# Patient Record
Sex: MALE | Race: WHITE | Employment: FULL TIME | URBAN - METROPOLITAN AREA
[De-identification: names, ages, dates, MRNs, and addresses within clinical notes are randomized per-mention and may not be internally consistent; named-entity substitution may affect disease eponyms.]

---

## 2018-06-13 ENCOUNTER — HOSPITAL ENCOUNTER (EMERGENCY)
Age: 33
Discharge: HOME | End: 2018-06-13
Attending: EMERGENCY MEDICINE
Payer: COMMERCIAL

## 2018-06-13 VITALS
DIASTOLIC BLOOD PRESSURE: 76 MMHG | RESPIRATION RATE: 16 BRPM | HEIGHT: 71 IN | HEART RATE: 98 BPM | SYSTOLIC BLOOD PRESSURE: 137 MMHG | OXYGEN SATURATION: 99 % | TEMPERATURE: 98.9 F

## 2018-06-13 DIAGNOSIS — E87.6 HYPOKALEMIA: ICD-10-CM

## 2018-06-13 DIAGNOSIS — T43.641A: Primary | ICD-10-CM

## 2018-06-13 LAB
ABSOLUTE EOS #: 0 K/UL (ref 0–0.4)
ABSOLUTE IMMATURE GRANULOCYTE: ABNORMAL K/UL (ref 0–0.3)
ABSOLUTE LYMPH #: 2.1 K/UL (ref 1–4.8)
ABSOLUTE MONO #: 0.5 K/UL (ref 0.1–1.3)
ACETAMINOPHEN LEVEL: <5 UG/ML (ref 10–30)
ALBUMIN SERPL-MCNC: 4 G/DL (ref 3.5–5.2)
ALBUMIN/GLOBULIN RATIO: ABNORMAL (ref 1–2.5)
ALP BLD-CCNC: 79 U/L (ref 40–129)
ALT SERPL-CCNC: 13 U/L (ref 5–41)
ANION GAP SERPL CALCULATED.3IONS-SCNC: 14 MMOL/L (ref 9–17)
AST SERPL-CCNC: 13 U/L
BASOPHILS # BLD: 1 % (ref 0–2)
BASOPHILS ABSOLUTE: 0.1 K/UL (ref 0–0.2)
BILIRUB SERPL-MCNC: 0.8 MG/DL (ref 0.3–1.2)
BUN BLDV-MCNC: 9 MG/DL (ref 6–20)
BUN/CREAT BLD: ABNORMAL (ref 9–20)
CALCIUM SERPL-MCNC: 8.7 MG/DL (ref 8.6–10.4)
CHLORIDE BLD-SCNC: 102 MMOL/L (ref 98–107)
CO2: 22 MMOL/L (ref 20–31)
CREAT SERPL-MCNC: 0.89 MG/DL (ref 0.7–1.2)
DIFFERENTIAL TYPE: ABNORMAL
EKG ATRIAL RATE: 115 BPM
EKG P AXIS: 64 DEGREES
EKG P-R INTERVAL: 140 MS
EKG Q-T INTERVAL: 342 MS
EKG QRS DURATION: 94 MS
EKG QTC CALCULATION (BAZETT): 473 MS
EKG R AXIS: 60 DEGREES
EKG T AXIS: 22 DEGREES
EKG VENTRICULAR RATE: 115 BPM
EOSINOPHILS RELATIVE PERCENT: 0 % (ref 0–4)
ETHANOL PERCENT: <0.01 %
ETHANOL: <10 MG/DL
GFR AFRICAN AMERICAN: >60 ML/MIN
GFR NON-AFRICAN AMERICAN: >60 ML/MIN
GFR SERPL CREATININE-BSD FRML MDRD: ABNORMAL ML/MIN/{1.73_M2}
GFR SERPL CREATININE-BSD FRML MDRD: ABNORMAL ML/MIN/{1.73_M2}
GLUCOSE BLD-MCNC: 100 MG/DL (ref 70–99)
HCT VFR BLD CALC: 39.9 % (ref 41–53)
HEMOGLOBIN: 14.3 G/DL (ref 13.5–17.5)
IMMATURE GRANULOCYTES: ABNORMAL %
LYMPHOCYTES # BLD: 27 % (ref 24–44)
MCH RBC QN AUTO: 32.6 PG (ref 26–34)
MCHC RBC AUTO-ENTMCNC: 35.9 G/DL (ref 31–37)
MCV RBC AUTO: 90.7 FL (ref 80–100)
MONOCYTES # BLD: 7 % (ref 1–7)
NRBC AUTOMATED: ABNORMAL PER 100 WBC
PDW BLD-RTO: 13.8 % (ref 11.5–14.9)
PLATELET # BLD: 330 K/UL (ref 150–450)
PLATELET ESTIMATE: ABNORMAL
PMV BLD AUTO: 7.2 FL (ref 6–12)
POTASSIUM SERPL-SCNC: 3 MMOL/L (ref 3.7–5.3)
RBC # BLD: 4.4 M/UL (ref 4.5–5.9)
RBC # BLD: ABNORMAL 10*6/UL
SALICYLATE LEVEL: <1 MG/DL (ref 3–10)
SEG NEUTROPHILS: 65 % (ref 36–66)
SEGMENTED NEUTROPHILS ABSOLUTE COUNT: 5.3 K/UL (ref 1.3–9.1)
SODIUM BLD-SCNC: 138 MMOL/L (ref 135–144)
TOTAL CK: 113 U/L (ref 39–308)
TOTAL PROTEIN: 6.7 G/DL (ref 6.4–8.3)
WBC # BLD: 8.1 K/UL (ref 3.5–11)
WBC # BLD: ABNORMAL 10*3/UL

## 2018-06-13 PROCEDURE — 99284 EMERGENCY DEPT VISIT MOD MDM: CPT

## 2018-06-13 PROCEDURE — G0480 DRUG TEST DEF 1-7 CLASSES: HCPCS

## 2018-06-13 PROCEDURE — 96374 THER/PROPH/DIAG INJ IV PUSH: CPT

## 2018-06-13 PROCEDURE — 93005 ELECTROCARDIOGRAM TRACING: CPT

## 2018-06-13 PROCEDURE — 36415 COLL VENOUS BLD VENIPUNCTURE: CPT

## 2018-06-13 PROCEDURE — 6370000000 HC RX 637 (ALT 250 FOR IP): Performed by: EMERGENCY MEDICINE

## 2018-06-13 PROCEDURE — 82550 ASSAY OF CK (CPK): CPT

## 2018-06-13 PROCEDURE — 85025 COMPLETE CBC W/AUTO DIFF WBC: CPT

## 2018-06-13 PROCEDURE — 80053 COMPREHEN METABOLIC PANEL: CPT

## 2018-06-13 PROCEDURE — 2580000003 HC RX 258: Performed by: EMERGENCY MEDICINE

## 2018-06-13 PROCEDURE — 6360000002 HC RX W HCPCS: Performed by: EMERGENCY MEDICINE

## 2018-06-13 PROCEDURE — 80307 DRUG TEST PRSMV CHEM ANLYZR: CPT

## 2018-06-13 RX ORDER — LORAZEPAM 2 MG/ML
1 INJECTION INTRAMUSCULAR ONCE
Status: COMPLETED | OUTPATIENT
Start: 2018-06-13 | End: 2018-06-13

## 2018-06-13 RX ORDER — POTASSIUM CHLORIDE 20 MEQ/1
40 TABLET, EXTENDED RELEASE ORAL ONCE
Status: COMPLETED | OUTPATIENT
Start: 2018-06-13 | End: 2018-06-13

## 2018-06-13 RX ORDER — 0.9 % SODIUM CHLORIDE 0.9 %
1000 INTRAVENOUS SOLUTION INTRAVENOUS ONCE
Status: COMPLETED | OUTPATIENT
Start: 2018-06-13 | End: 2018-06-13

## 2018-06-13 RX ADMIN — SODIUM CHLORIDE 1000 ML: 9 INJECTION, SOLUTION INTRAVENOUS at 02:44

## 2018-06-13 RX ADMIN — POTASSIUM CHLORIDE 40 MEQ: 1500 TABLET, EXTENDED RELEASE ORAL at 03:30

## 2018-06-13 RX ADMIN — LORAZEPAM 1 MG: 2 INJECTION INTRAMUSCULAR; INTRAVENOUS at 03:30

## 2018-06-13 ASSESSMENT — ENCOUNTER SYMPTOMS
SHORTNESS OF BREATH: 0
COUGH: 0
ABDOMINAL PAIN: 0

## 2018-08-18 ENCOUNTER — HOSPITAL ENCOUNTER (EMERGENCY)
Age: 33
Discharge: HOME | End: 2018-08-18
Attending: EMERGENCY MEDICINE
Payer: COMMERCIAL

## 2018-08-18 VITALS
DIASTOLIC BLOOD PRESSURE: 73 MMHG | SYSTOLIC BLOOD PRESSURE: 117 MMHG | HEIGHT: 72 IN | WEIGHT: 250 LBS | HEART RATE: 82 BPM | OXYGEN SATURATION: 98 % | TEMPERATURE: 98.2 F | BODY MASS INDEX: 33.86 KG/M2 | RESPIRATION RATE: 16 BRPM

## 2018-08-18 DIAGNOSIS — L02.419 AXILLARY ABSCESS: Primary | ICD-10-CM

## 2018-08-18 PROCEDURE — 99282 EMERGENCY DEPT VISIT SF MDM: CPT

## 2018-08-18 RX ORDER — CEPHALEXIN 500 MG/1
500 CAPSULE ORAL 4 TIMES DAILY
Qty: 28 CAPSULE | Refills: 0 | Status: SHIPPED | OUTPATIENT
Start: 2018-08-18 | End: 2018-08-25

## 2018-08-18 ASSESSMENT — PAIN SCALES - WONG BAKER: WONGBAKER_NUMERICALRESPONSE: 6

## 2018-08-18 ASSESSMENT — ENCOUNTER SYMPTOMS
WHEEZING: 0
SORE THROAT: 0
BACK PAIN: 0
NAUSEA: 0
ABDOMINAL PAIN: 0
SHORTNESS OF BREATH: 0
PHOTOPHOBIA: 0
CHEST TIGHTNESS: 0
COUGH: 0
TROUBLE SWALLOWING: 0
VOMITING: 0

## 2018-08-18 ASSESSMENT — PAIN DESCRIPTION - ORIENTATION: ORIENTATION: LEFT

## 2018-08-18 ASSESSMENT — PAIN DESCRIPTION - PAIN TYPE: TYPE: ACUTE PAIN

## 2018-08-18 ASSESSMENT — PAIN DESCRIPTION - FREQUENCY: FREQUENCY: CONTINUOUS

## 2018-08-18 ASSESSMENT — PAIN DESCRIPTION - DESCRIPTORS: DESCRIPTORS: CONSTANT

## 2018-08-18 NOTE — ED PROVIDER NOTES
every 8 hours as needed for Muscle spasms 6/23/16   Jose Cruz Sorenson PA-C   ibuprofen (ADVIL;MOTRIN) 800 MG tablet Take 1 tablet by mouth every 8 hours as needed for Pain 6/23/16   Jose Cruz Sorenson PA-C   lurasidone (LATUDA) 40 MG TABS tablet Take 40 mg by mouth daily    Historical Provider, MD   fluticasone (FLONASE) 50 MCG/ACT nasal spray 1 spray by Nasal route 2 times daily 10/7/15   Juanito Gallo MD   pseudoephedrine (SUDAFED) 60 MG tablet Take 1 tablet by mouth every 6 hours as needed for Congestion 10/7/15   Juanito Gallo MD   albuterol (PROVENTIL) (2.5 MG/3ML) 0.083% nebulizer solution Take 2.5 mg by nebulization every 6 hours as needed for Wheezing. Historical Provider, MD   ALPRAZolam (XANAX) 0.25 MG tablet Take 1 mg by mouth 3 times daily as needed for Sleep     Historical Provider, MD       REVIEW OF SYSTEMS    (2-9 systems for level 4, 10 or more for level 5)      Review of Systems   Constitutional: Negative for chills and fever. HENT: Negative for sore throat and trouble swallowing. Eyes: Negative for photophobia. Respiratory: Negative for cough, chest tightness, shortness of breath and wheezing. Cardiovascular: Negative for chest pain and palpitations. Gastrointestinal: Negative for abdominal pain, nausea and vomiting. Endocrine: Negative for polyuria. Genitourinary: Negative for dysuria and flank pain. Musculoskeletal: Negative for back pain and neck pain. Skin: Positive for rash. Negative for wound. Neurological: Negative for syncope, weakness, light-headedness and headaches. Psychiatric/Behavioral: Negative for agitation and confusion. PHYSICAL EXAM   (up to 7 for level 4, 8 or more for level 5)      INITIAL VITALS:   /73   Pulse 82   Temp 98.2 °F (36.8 °C) (Oral)   Resp 16   Ht 6' (1.829 m)   Wt 250 lb (113.4 kg)   SpO2 98%   BMI 33.91 kg/m²     Physical Exam   Constitutional: He is oriented to person, place, and time.  He appears well-developed and well-nourished. HENT:   Head: Normocephalic and atraumatic. Nose: Nose normal.   Mouth/Throat: Oropharynx is clear and moist.   Eyes: Pupils are equal, round, and reactive to light. EOM are normal.   Neck: Normal range of motion. Neck supple. Cardiovascular: Normal rate, regular rhythm, normal heart sounds and intact distal pulses. Pulmonary/Chest: Breath sounds normal. No respiratory distress. He has no wheezes. He has no rales. Abdominal: Soft. Bowel sounds are normal. He exhibits no distension. There is no tenderness. Musculoskeletal: Normal range of motion. He exhibits no edema or deformity. Neurological: He is alert and oriented to person, place, and time. He has normal reflexes. Skin: Skin is warm and dry. Rash noted. No erythema. Patient with 2, approximately half a centimeter firm erythematous bumps under her left axillary region. No surrounding erythema, drainage   Psychiatric: He has a normal mood and affect. His behavior is normal.       DIFFERENTIAL  DIAGNOSIS     PLAN (LABS / IMAGING / EKG):  No orders of the defined types were placed in this encounter. MEDICATIONS ORDERED:  Orders Placed This Encounter   Medications    cephALEXin (KEFLEX) 500 MG capsule     Sig: Take 1 capsule by mouth 4 times daily for 7 days     Dispense:  28 capsule     Refill:  0       DDX: Abscess/cellulitis/adenopathy    DIAGNOSTIC RESULTS / EMERGENCY DEPARTMENT COURSE / MDM     LABS:  No results found for this visit on 08/18/18. RADIOLOGY:  None    EKG  None    All EKG's are interpreted by the Emergency Department Physician who either signs or Co-signs this chart in the absence of a cardiologist.    EMERGENCY DEPARTMENT COURSE:  Patient nontoxic-appearing. He is afebrile here in the ED. Patient has 2 small half centimeter abscesses located under the left axilla.   Bedside L Yosef sewing half centimeter fluid collection that is superficial.  No indication for drainage at this time.  We'll prescribe patient Keflex for 1 week and have patient follow up with PCP. Instructed patient to return if abscesses grower become increasingly painful. Also, patient understands return if he experienced fever, nausea, vomiting. We'll discharge patient home with Keflex. PROCEDURES:  None    CONSULTS:  None    CRITICAL CARE:  None    FINAL IMPRESSION      1.  Axillary abscess          DISPOSITION / PLAN     DISPOSITION Decision To Discharge 08/18/2018 09:39:50 AM      PATIENT REFERRED TO:  Christelle Peterson MD  [address] [facility] [address] [address] [facility]  [phone]    Schedule an appointment as soon as possible for a visit in 2 days      [facility] ED  [address] [address]  [phone]  Go to   If symptoms worsen      DISCHARGE MEDICATIONS:  Discharge Medication List as of 8/18/2018  9:40 AM      START taking these medications    Details   cephALEXin (KEFLEX) 500 MG capsule Take 1 capsule by mouth 4 times daily for 7 days, Disp-28 capsule, R-0Print             Luc Cottrell MD  Emergency Medicine Resident    (Please note that portions of this note were completed with a voice recognition program.  Efforts were made to edit the dictations but occasionally words are mis-transcribed.)       Luc Cottrell MD  08/18/18 2753

## 2018-09-05 ENCOUNTER — HOSPITAL ENCOUNTER (EMERGENCY)
Age: 33
Discharge: HOME | End: 2018-09-05
Attending: EMERGENCY MEDICINE
Payer: COMMERCIAL

## 2018-09-05 VITALS
BODY MASS INDEX: 30.8 KG/M2 | DIASTOLIC BLOOD PRESSURE: 89 MMHG | SYSTOLIC BLOOD PRESSURE: 156 MMHG | HEART RATE: 73 BPM | TEMPERATURE: 98.2 F | RESPIRATION RATE: 17 BRPM | OXYGEN SATURATION: 99 % | WEIGHT: 220 LBS | HEIGHT: 71 IN

## 2018-09-05 DIAGNOSIS — R21 RASH AND OTHER NONSPECIFIC SKIN ERUPTION: Primary | ICD-10-CM

## 2018-09-05 PROCEDURE — 99282 EMERGENCY DEPT VISIT SF MDM: CPT

## 2018-09-05 RX ORDER — DIPHENHYDRAMINE HCL 25 MG
25 CAPSULE ORAL EVERY 6 HOURS PRN
Qty: 30 CAPSULE | Refills: 0 | Status: SHIPPED | OUTPATIENT
Start: 2018-09-05 | End: 2018-09-15

## 2018-09-05 ASSESSMENT — ENCOUNTER SYMPTOMS
VOMITING: 0
SHORTNESS OF BREATH: 0
NAUSEA: 0
ABDOMINAL PAIN: 0

## 2018-09-05 NOTE — ED PROVIDER NOTES
normal and breath sounds normal. No respiratory distress. He has no wheezes. He has no rales. Neurological: He is alert and oriented to person, place, and time. Skin: Skin is warm and dry. Diffuse papular rash involving the trunk most severe and bilateral axilla and flanks. No excoriations noted. No involvement of the palms or soles. No oral mucosal involvement. Rash is blanchable. DIFFERENTIAL  DIAGNOSIS     PLAN (LABS / IMAGING / EKG):  No orders of the defined types were placed in this encounter. MEDICATIONS ORDERED:  Orders Placed This Encounter   Medications    diphenhydrAMINE (BENADRYL) 25 MG capsule     Sig: Take 1 capsule by mouth every 6 hours as needed for Itching     Dispense:  30 capsule     Refill:  0       DDX: Heat rash, poison ivy, allergic dermatitis, viral exanthem, zoster    DIAGNOSTIC RESULTS / EMERGENCY DEPARTMENT COURSE / MDM     LABS:  No results found for this visit on 09/05/18. RADIOLOGY:  None     EKG  None     All EKG's are interpreted by the Emergency Department Physician who either signs or Co-signs this chart in the absence of a cardiologist.    EMERGENCY DEPARTMENT COURSE:  Patient presented emergency department for evaluation of rash. On initial evaluation, vitals aren't unremarkable. Lungs are clear to auscultation bilaterally. Patient was noted to have a blanchable, papular rash involving the trunk. Rash appears to be due to heat and excessive sweating moisture, but not candidal in appearance. Low suspicion for zoster given bilateral involvement. No bulla were noted and rash is noted on exposed areas, so low suspicion for poison ivy. Unsure as the cause the patient's symptoms, but will treat symptomatically with Benadryl as needed for itching.   He was instructed to follow-up with his primary care physician in the next few days for reevaluation of the rash and told to come back to emergency department if his rash worsen, if he developed fevers, or any

## 2018-09-13 ENCOUNTER — HOSPITAL ENCOUNTER (EMERGENCY)
Age: 33
Discharge: HOME | End: 2018-09-13
Attending: EMERGENCY MEDICINE
Payer: COMMERCIAL

## 2018-09-13 VITALS
DIASTOLIC BLOOD PRESSURE: 110 MMHG | SYSTOLIC BLOOD PRESSURE: 135 MMHG | WEIGHT: 225 LBS | TEMPERATURE: 98.1 F | HEART RATE: 70 BPM | BODY MASS INDEX: 31.38 KG/M2 | RESPIRATION RATE: 16 BRPM | OXYGEN SATURATION: 99 %

## 2018-09-13 DIAGNOSIS — S39.012A STRAIN OF LUMBAR REGION, INITIAL ENCOUNTER: Primary | ICD-10-CM

## 2018-09-13 PROCEDURE — 99283 EMERGENCY DEPT VISIT LOW MDM: CPT

## 2018-09-13 PROCEDURE — 6370000000 HC RX 637 (ALT 250 FOR IP): Performed by: EMERGENCY MEDICINE

## 2018-09-13 RX ORDER — ORPHENADRINE CITRATE 100 MG/1
100 TABLET, EXTENDED RELEASE ORAL 2 TIMES DAILY
Qty: 14 TABLET | Refills: 0 | Status: SHIPPED | OUTPATIENT
Start: 2018-09-13

## 2018-09-13 RX ORDER — CYCLOBENZAPRINE HCL 10 MG
10 TABLET ORAL ONCE
Status: COMPLETED | OUTPATIENT
Start: 2018-09-13 | End: 2018-09-13

## 2018-09-13 RX ORDER — LIDOCAINE 50 MG/G
1 PATCH TOPICAL DAILY
Qty: 30 PATCH | Refills: 0 | Status: ON HOLD | OUTPATIENT
Start: 2018-09-13 | End: 2022-08-26

## 2018-09-13 RX ADMIN — CYCLOBENZAPRINE HYDROCHLORIDE 10 MG: 10 TABLET, FILM COATED ORAL at 09:50

## 2018-09-13 ASSESSMENT — PAIN SCALES - GENERAL: PAINLEVEL_OUTOF10: 9

## 2018-09-13 ASSESSMENT — PAIN DESCRIPTION - DESCRIPTORS: DESCRIPTORS: ACHING

## 2018-09-13 ASSESSMENT — ENCOUNTER SYMPTOMS
ABDOMINAL PAIN: 0
BACK PAIN: 1
COUGH: 0
NAUSEA: 0
CHEST TIGHTNESS: 0
VOMITING: 0
SHORTNESS OF BREATH: 0

## 2018-09-13 ASSESSMENT — PAIN DESCRIPTION - PROGRESSION: CLINICAL_PROGRESSION: GRADUALLY WORSENING

## 2018-09-13 ASSESSMENT — PAIN DESCRIPTION - ONSET: ONSET: SUDDEN

## 2018-09-13 ASSESSMENT — PAIN DESCRIPTION - LOCATION: LOCATION: BACK

## 2018-09-13 ASSESSMENT — PAIN DESCRIPTION - PAIN TYPE: TYPE: ACUTE PAIN

## 2018-09-13 ASSESSMENT — PAIN DESCRIPTION - ORIENTATION: ORIENTATION: LOWER

## 2018-09-13 NOTE — ED PROVIDER NOTES
Temp 98.1 °F (36.7 °C) (Oral)   Resp 16   Wt 225 lb (102.1 kg)   SpO2 99%   BMI 31.38 kg/m²     Physical Exam   Constitutional: He is oriented to person, place, and time. He appears well-developed and well-nourished. No distress. HENT:   Head: Normocephalic and atraumatic. Right Ear: External ear normal.   Left Ear: External ear normal.   Nose: No nose lacerations or nasal deformity. Eyes: Conjunctivae and EOM are normal.   Neck: Normal range of motion. No JVD present. No tracheal deviation present. Cardiovascular: Normal rate and regular rhythm. No murmur heard. Pulmonary/Chest: Effort normal and breath sounds normal.   Abdominal: Normal appearance. There is no tenderness. Musculoskeletal: Normal range of motion. No midline cervical thoracic or lumbar spine tenderness, some paraspinal muscle fullness in the thoracic and lumbar area on the left. Neurological: He is alert and oriented to person, place, and time. Cranial nerves II through XII grossly intact bilaterally. Muscle strength 5 out of 5 in upper and lower extremities bilaterally . Sensation intact to face and extremities, cerebellar testing normal including normal finger-nose-finger and heel-to-shin. DTRs 2+. Steady gait without assistance. Skin: Skin is warm and intact. DIFFERENTIAL  DIAGNOSIS     PLAN (LABS / IMAGING / EKG):  No orders of the defined types were placed in this encounter. MEDICATIONS ORDERED:  Orders Placed This Encounter   Medications    cyclobenzaprine (FLEXERIL) tablet 10 mg    orphenadrine (NORFLEX) 100 MG extended release tablet     Sig: Take 1 tablet by mouth 2 times daily     Dispense:  14 tablet     Refill:  0    lidocaine (LIDODERM) 5 %     Sig: Place 1 patch onto the skin daily 12 hours on, 12 hours off.      Dispense:  30 patch     Refill:  0       DDX: Muscle spasm, lumbar/thoracic back strain, low suspicion for fracture given no midline cervical spine tenderness, low suspicion for

## 2019-11-21 ENCOUNTER — HOSPITAL ENCOUNTER (EMERGENCY)
Age: 34
Discharge: LEFT BEFORE DISCHARGE | End: 2019-11-21
Attending: EMERGENCY MEDICINE

## 2019-11-21 VITALS
BODY MASS INDEX: 30.1 KG/M2 | TEMPERATURE: 98.9 F | HEIGHT: 71 IN | DIASTOLIC BLOOD PRESSURE: 89 MMHG | HEART RATE: 88 BPM | OXYGEN SATURATION: 100 % | WEIGHT: 215 LBS | RESPIRATION RATE: 16 BRPM | SYSTOLIC BLOOD PRESSURE: 147 MMHG

## 2019-11-21 DIAGNOSIS — Z53.21 PATIENT LEFT WITHOUT BEING SEEN: Primary | ICD-10-CM

## 2019-11-21 ASSESSMENT — PAIN DESCRIPTION - ORIENTATION: ORIENTATION: RIGHT

## 2019-11-21 ASSESSMENT — PAIN DESCRIPTION - DESCRIPTORS: DESCRIPTORS: DISCOMFORT;ACHING

## 2019-11-21 ASSESSMENT — PAIN DESCRIPTION - LOCATION: LOCATION: BACK;SHOULDER

## 2019-11-21 ASSESSMENT — PAIN SCALES - GENERAL: PAINLEVEL_OUTOF10: 8

## 2019-11-21 ASSESSMENT — PAIN DESCRIPTION - PAIN TYPE: TYPE: ACUTE PAIN

## 2019-11-21 ASSESSMENT — PAIN DESCRIPTION - FREQUENCY: FREQUENCY: CONTINUOUS

## 2021-09-22 ENCOUNTER — HOSPITAL ENCOUNTER (OUTPATIENT)
Age: 36
Discharge: HOME | End: 2021-09-22
Payer: COMMERCIAL

## 2021-09-22 ENCOUNTER — HOSPITAL ENCOUNTER (OUTPATIENT)
Age: 36
Discharge: HOME | End: 2021-09-24
Payer: COMMERCIAL

## 2021-09-22 ENCOUNTER — HOSPITAL ENCOUNTER (OUTPATIENT)
Dept: GENERAL RADIOLOGY | Age: 36
Discharge: HOME | End: 2021-09-24
Payer: COMMERCIAL

## 2021-09-22 ENCOUNTER — HOSPITAL ENCOUNTER (EMERGENCY)
Age: 36
Discharge: HOME | End: 2021-09-22
Attending: EMERGENCY MEDICINE
Payer: COMMERCIAL

## 2021-09-22 VITALS
SYSTOLIC BLOOD PRESSURE: 117 MMHG | OXYGEN SATURATION: 100 % | WEIGHT: 216 LBS | TEMPERATURE: 98.3 F | RESPIRATION RATE: 18 BRPM | HEIGHT: 71 IN | HEART RATE: 89 BPM | DIASTOLIC BLOOD PRESSURE: 79 MMHG | BODY MASS INDEX: 30.24 KG/M2

## 2021-09-22 DIAGNOSIS — J45.901 MILD ASTHMA EXACERBATION: Primary | ICD-10-CM

## 2021-09-22 DIAGNOSIS — F19.11 DRUG ABUSE IN REMISSION (HCC): ICD-10-CM

## 2021-09-22 PROCEDURE — 99282 EMERGENCY DEPT VISIT SF MDM: CPT

## 2021-09-22 PROCEDURE — 71045 X-RAY EXAM CHEST 1 VIEW: CPT

## 2021-09-22 PROCEDURE — 93005 ELECTROCARDIOGRAM TRACING: CPT | Performed by: NURSE PRACTITIONER

## 2021-09-22 RX ORDER — PREDNISONE 20 MG/1
40 TABLET ORAL DAILY
Qty: 10 TABLET | Refills: 0 | Status: SHIPPED | OUTPATIENT
Start: 2021-09-22 | End: 2021-09-27

## 2021-09-22 ASSESSMENT — ENCOUNTER SYMPTOMS
VOICE CHANGE: 0
TROUBLE SWALLOWING: 0
NAUSEA: 0
BLOOD IN STOOL: 0
PHOTOPHOBIA: 0
EYES NEGATIVE: 1
WHEEZING: 0
CONSTIPATION: 0
VOMITING: 0
COUGH: 0
CHEST TIGHTNESS: 1
SHORTNESS OF BREATH: 1
ABDOMINAL PAIN: 0
ALLERGIC/IMMUNOLOGIC NEGATIVE: 1

## 2021-09-22 NOTE — ED TRIAGE NOTES
Pt has asthma and today feels like his lungs are \"heavy. \" denies any COVID exposure or s/s. States he gets this every fall and just wants to be checked out.

## 2021-09-22 NOTE — ED PROVIDER NOTES
[facility]  ED  Emergency Department Encounter  Emergency Medicine Resident     Pt Name: Kishor Bailey  MRN: [id number]  [place] 1985  Date of evaluation: 9/22/21  PCP:  Julia Parekh       Chief Complaint   Patient presents with    Asthma       HISTORY [place]  (Location/Symptom, Timing/Onset, Context/Setting, Quality, Duration, Modifying Factors,Severity.)      Kishor Bailey is a 39 y. o.yo male who presents with chief complaint of shortness of breath earlier this morning, now resolved. Pt has history asthma, this morning he had an episode of shortness of breath, used his albuterol inhaler, did not get better right away, went to work, was still feeling little short of breath and heaviness in the lungs, eventually got better, right now no shortness of breath or wheezing. Wanted to get checked. He uses fluticasone inhaler twice daily and albuterol inhaler once daily. He has been pretty stable past few years, no major hospitalizations or flare ups. Denies any fever/chills/ being sick around anyone. On examination, mild expiratory wheezing heard. PAST MEDICAL / SURGICAL / SOCIAL / FAMILY HISTORY      has a past medical history of Asthma. has no past surgical history on file.      Social History     Socioeconomic History    Marital status: Legally      Spouse name: Not on file    Number of children: Not on file    Years of education: Not on file    Highest education level: Not on file   Occupational History    Not on file   Tobacco Use    Smoking status: Never Smoker    Smokeless tobacco: Current User     Types: Chew    Tobacco comment: reports using 1 can in 3 days   Substance and Sexual Activity    Alcohol use: Yes     Comment: social    Drug use: No    Sexual activity: Yes   Other Topics Concern    Not on file   Social History Narrative    Not on file     Social Determinants of Health     Financial Resource Strain:    Melvina Me albuterol (PROVENTIL) (2.5 MG/3ML) 0.083% nebulizer solution Take 2.5 mg by nebulization every 6 hours as needed for Wheezing. Historical Provider, MD   ALPRAZolam (XANAX) 0.25 MG tablet Take 1 mg by mouth 3 times daily as needed for Sleep     Historical Provider, MD       REVIEW OFSYSTEMS    (2-9 systems for level 4, 10 or more for level 5)      Review of Systems   Constitutional: Negative. Negative for activity change, appetite change, chills, fatigue and fever. HENT: Negative. Negative for ear discharge, ear pain, trouble swallowing and voice change. Eyes: Negative. Negative for photophobia and visual disturbance. Respiratory: Positive for chest tightness and shortness of breath. Negative for cough and wheezing. Cardiovascular: Negative for chest pain, palpitations and leg swelling. Gastrointestinal: Negative for abdominal pain, blood in stool, constipation, nausea and vomiting. Endocrine: Negative. Genitourinary: Negative. Negative for dysuria. Musculoskeletal: Negative. Negative for arthralgias, joint swelling and myalgias. Skin: Negative. Negative for rash and wound. Allergic/Immunologic: Negative. Neurological: Negative. Negative for dizziness, seizures, weakness, light-headedness and headaches. Hematological: Negative. Psychiatric/Behavioral: Negative. Negative for sleep disturbance. PHYSICAL EXAM   (up to 7 for level 4, 8 or more forlevel 5)      INITIAL VITALS:   ED Triage Vitals   BP Temp Temp Source Pulse Resp SpO2 Height Weight   09/22/21 1315 09/22/21 1315 09/22/21 1315 09/22/21 1315 09/22/21 1359 09/22/21 1315 09/22/21 1359 09/22/21 1359   117/79 98.3 °F (36.8 °C) Temporal 89 18 100 % 5' 11\" (1.803 m) 216 lb (98 kg)       Physical Exam  Constitutional:       Appearance: Normal appearance. HENT:      Head: Normocephalic and atraumatic.       Nose: Nose normal.      Mouth/Throat:      Mouth: Mucous membranes are moist.   Eyes:      Extraocular Movements: Extraocular movements intact. Cardiovascular:      Rate and Rhythm: Normal rate and regular rhythm. Pulses: Normal pulses. Heart sounds: Normal heart sounds. No murmur heard. Pulmonary:      Effort: Pulmonary effort is normal. No respiratory distress. Breath sounds: Wheezing present. No rales. Comments: Mild expiratory wheezing  Abdominal:      General: There is no distension. Palpations: Abdomen is soft. Tenderness: There is no abdominal tenderness. Musculoskeletal:         General: No swelling. Normal range of motion. Cervical back: Normal range of motion. No rigidity. Right lower leg: No edema. Left lower leg: No edema. Skin:     General: Skin is warm. Coloration: Skin is not jaundiced. Findings: No bruising or rash. Neurological:      General: No focal deficit present. Mental Status: He is alert and oriented to person, place, and time. Psychiatric:         Mood and Affect: Mood normal.         Behavior: Behavior normal.         DIFFERENTIAL  DIAGNOSIS     PLAN (LABS / IMAGING / EKG):  No orders of the defined types were placed in this encounter. MEDICATIONS ORDERED:  Orders Placed This Encounter   Medications    predniSONE (DELTASONE) 20 MG tablet     Sig: Take 2 tablets by mouth daily for 5 days     Dispense:  10 tablet     Refill:  0       DDX: mild asthma exacerbation    Initial MDM/Plan: 39 y.o. male who presents with shortness of breath and chest tightness earlier this morning. Has history of asthma, uses fluticasone I and albuterol inhaler at home. Pt has simialr episodes when the wether changes in the past. Plan is to discharge him with some oral prednisone for 5 days.  And will recommend monitoring for any worsening    DIAGNOSTIC RESULTS / EMERGENCYDEPARTMENT COURSE / MDM     LABS:  Labs Reviewed - No data to display      RADIOLOGY:  XR CHEST (SINGLE VIEW FRONTAL)    Result Date: 9/22/2021  EXAMINATION: ONE XRAY VIEW OF THE CHEST 9/22/2021 1:04 pm COMPARISON: None. HISTORY: ORDERING SYSTEM PROVIDED HISTORY: Drug abuse in remission [facility]) FINDINGS: Normal cardiopericardial silhouette There are no significant pleural, parenchymal, mediastinal or osseous findings     No acute cardiopulmonary findings         EKG  None    All EKG's are interpreted by the Emergency Department Physicianwho either signs or Co-signs this chart in the absence of a cardiologist.    EMERGENCY DEPARTMENT COURSE:  ED Course as of Sep 22 1544   Wed Sep 22, 2021   1414 Pt with history asthma came in because, this morning he had an episode of shortness of breath, used his albuterol inhaler, did not get better right away, went to work, was still feeling little short of breath and heaviness in the lungs, eventually got better, right now no shortness of breath or wheezing. Wanted to get checked. [NA]   1416 On examination , normal breath sounds  Mild expiratory wheezing    [NA]   1434 Decision to discharge with prednisone for 5 days      [NA]      ED Course User Index  [NA] Misty Benedict MD          PROCEDURES:  None    CONSULTS:  None    CRITICAL CARE:  none      FINAL IMPRESSION      1.  Mild asthma exacerbation          DISPOSITION / PLAN     DISPOSITION Decision To Discharge 09/22/2021 02:25:22 PM      PATIENT REFERRED TO:  Fredrick Cain  [address] [place] [address]  [facility]  [address]  [phone]      As needed, If symptoms worsen    [facility] ED  [address] [address]  [phone]    As needed, If symptoms worsen      DISCHARGE MEDICATIONS:  Discharge Medication List as of 9/22/2021  2:30 PM      START taking these medications    Details   predniSONE (DELTASONE) 20 MG tablet Take 2 tablets by mouth daily for 5 days, Disp-10 tablet, R-0Print             Misty Benedict MD  Emergency Medicine Resident    (Please note that portions of this note were completed with a voice recognition program.Efforts were made to edit the dictations but occasionally words are mis-transcribed.)     Irene Bhatt MD  Resident  09/22/21 [id number]

## 2021-09-22 NOTE — ED NOTES
Pt states this morning he was short of breath, however now he is feeling better and no complains of shortness of breath.   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

## 2021-09-22 NOTE — ED PROVIDER NOTES
[address]     Emergency Department     Faculty Attestation    I performed a history and physical examination of the patient and discussed management with the resident. I reviewed the residents note and agree with the documented findings and plan of care. Any areas of disagreement are noted on the chart. I was personally present for the key portions of any procedures. I have documented in the chart those procedures where I was not present during the key portions. I have reviewed the emergency nurses triage note. I agree with the chief complaint, past medical history, past surgical history, allergies, medications, social and family history as documented unless otherwise noted below. For Physician Assistant/ Nurse Practitioner cases/documentation I have personally evaluated this patient and have completed at least one if not all key elements of the E/M (history, physical exam, and MDM). Additional findings are as noted. I have personally seen and evaluated the patient. I find the patient's history and physical exam are consistent with the NP/PA documentation. I agree with the care provided, treatment rendered, disposition and follow-up plan. 70-year-old male with a history of asthma presenting with mild shortness of breath. States that weather change was flare of his asthma, tried albuterol this morning without significant relief. Got to work, and was sent for evaluation. He states that he feels that the albuterol has now kicked in, and his breathing is better but he still feels like he is wheezing. No fever or chills. No recent sick contacts. Exam:  General: Sitting on the bed, awake, alert and in no acute distress  CV: normal rate and regular rhythm  Lungs: Breathing comfortably on room air with no tachypnea, hypoxia, or increased work of breathing and Expiratory wheezes present in all lung fields    Plan:  Mild asthma exacerbation. No signs of pneumonia.   Will prescribe prednisone burst.  Patient has adequate supply of albuterol rescue inhaler. Recommend follow-up with PCP if symptoms continue.         Jorje Ambrosio MD   Attending Emergency  Physician    (Please note that portions of this note were completed with a voice recognition program. Efforts were made to edit the dictations but occasionally words are mis-transcribed.)              Jorje Ambrosio MD  09/23/21 [id number]

## 2021-09-23 LAB
EKG ATRIAL RATE: 90 BPM
EKG P AXIS: 71 DEGREES
EKG P-R INTERVAL: 146 MS
EKG Q-T INTERVAL: 350 MS
EKG QRS DURATION: 98 MS
EKG QTC CALCULATION (BAZETT): 428 MS
EKG R AXIS: 68 DEGREES
EKG T AXIS: 21 DEGREES
EKG VENTRICULAR RATE: 90 BPM

## 2021-09-23 PROCEDURE — 93010 ELECTROCARDIOGRAM REPORT: CPT | Performed by: INTERNAL MEDICINE

## 2021-10-09 ENCOUNTER — APPOINTMENT (OUTPATIENT)
Dept: GENERAL RADIOLOGY | Age: 36
End: 2021-10-09
Payer: COMMERCIAL

## 2021-10-09 ENCOUNTER — HOSPITAL ENCOUNTER (EMERGENCY)
Age: 36
Discharge: HOME | End: 2021-10-09
Attending: EMERGENCY MEDICINE
Payer: COMMERCIAL

## 2021-10-09 VITALS
HEART RATE: 90 BPM | TEMPERATURE: 99 F | SYSTOLIC BLOOD PRESSURE: 147 MMHG | DIASTOLIC BLOOD PRESSURE: 89 MMHG | HEIGHT: 71 IN | RESPIRATION RATE: 20 BRPM | BODY MASS INDEX: 35.7 KG/M2 | OXYGEN SATURATION: 98 % | WEIGHT: 255 LBS

## 2021-10-09 DIAGNOSIS — T14.8XXA MUSCLE STRAIN: Primary | ICD-10-CM

## 2021-10-09 LAB
-: NORMAL
AMORPHOUS: NORMAL
BACTERIA: NORMAL
BILIRUBIN URINE: NEGATIVE
CASTS UA: NORMAL /LPF (ref 0–8)
COLOR: YELLOW
CRYSTALS, UA: NORMAL /HPF
EPITHELIAL CELLS UA: NORMAL /HPF (ref 0–5)
GLUCOSE URINE: NEGATIVE
KETONES, URINE: NEGATIVE
LEUKOCYTE ESTERASE, URINE: NEGATIVE
MUCUS: NORMAL
NITRITE, URINE: NEGATIVE
OTHER OBSERVATIONS UA: NORMAL
PH UA: 7 (ref 5–8)
PROTEIN UA: NEGATIVE
RBC UA: NORMAL /HPF (ref 0–4)
RENAL EPITHELIAL, UA: NORMAL /HPF
SPECIFIC GRAVITY UA: 1.01 (ref 1–1.03)
TRICHOMONAS: NORMAL
TURBIDITY: CLEAR
URINE HGB: NEGATIVE
UROBILINOGEN, URINE: NORMAL
WBC UA: NORMAL /HPF (ref 0–5)
YEAST: NORMAL

## 2021-10-09 PROCEDURE — 81001 URINALYSIS AUTO W/SCOPE: CPT

## 2021-10-09 PROCEDURE — 71046 X-RAY EXAM CHEST 2 VIEWS: CPT

## 2021-10-09 PROCEDURE — 99282 EMERGENCY DEPT VISIT SF MDM: CPT

## 2021-10-09 RX ORDER — CYCLOBENZAPRINE HCL 5 MG
5 TABLET ORAL 2 TIMES DAILY PRN
Qty: 10 TABLET | Refills: 0 | Status: SHIPPED | OUTPATIENT
Start: 2021-10-09 | End: 2021-10-19

## 2021-10-09 RX ORDER — IBUPROFEN 400 MG/1
400 TABLET ORAL EVERY 6 HOURS PRN
Qty: 20 TABLET | Refills: 0 | Status: ON HOLD | OUTPATIENT
Start: 2021-10-09 | End: 2022-08-26

## 2021-10-09 RX ORDER — LIDOCAINE 4 G/G
1 PATCH TOPICAL DAILY
Status: DISCONTINUED | OUTPATIENT
Start: 2021-10-09 | End: 2021-10-09 | Stop reason: HOSPADM

## 2021-10-09 ASSESSMENT — ENCOUNTER SYMPTOMS
COUGH: 0
SHORTNESS OF BREATH: 1
VOMITING: 0
NAUSEA: 0
ABDOMINAL PAIN: 0

## 2021-10-09 ASSESSMENT — PAIN SCALES - GENERAL: PAINLEVEL_OUTOF10: 7

## 2021-10-09 NOTE — ED NOTES
This patient was assessed by the doctor only. Nurse processed and completed the orders from this doctor ie labs, meds, and/or EKG.         Mireille Jefferson RN  10/09/21 [id number]

## 2021-10-09 NOTE — ED NOTES
Bed: 38  Expected date:   Expected time:   Means of arrival:   Comments:     Zulema Brunner, RN  10/09/21 110
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
## 2021-10-09 NOTE — ED PROVIDER NOTES
[facility]     Emergency Department     Faculty Attestation    I performed a history and physical examination of the patient and discussed management with the resident. I reviewed the residents note and agree with the documented findings and plan of care. Any areas of disagreement are noted on the chart. I was personally present for the key portions of any procedures. I have documented in the chart those procedures where I was not present during the key portions. I have reviewed the emergency nurses triage note. I agree with the chief complaint, past medical history, past surgical history, allergies, medications, social and family history as documented unless otherwise noted below. For Physician Assistant/ Nurse Practitioner cases/documentation I have personally evaluated this patient and have completed at least one if not all key elements of the E/M (history, physical exam, and MDM). Additional findings are as noted. I have personally seen and evaluated the patient. I find the patient's history and physical exam are consistent with the NP/PA documentation. I agree with the care provided, treatment rendered, disposition and follow-up plan. reProducible right-sided chest discomfort deep inspiration sharp in nature no acute respiratory distress. Critical Care     Seun Lopez M.D.   Attending Emergency  Physician              Nikki Park MD  10/09/21 1343
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
## 2022-08-01 PROCEDURE — 99283 EMERGENCY DEPT VISIT LOW MDM: CPT

## 2022-08-02 ENCOUNTER — HOSPITAL ENCOUNTER (EMERGENCY)
Age: 37
Discharge: HOME | End: 2022-08-02
Attending: EMERGENCY MEDICINE
Payer: COMMERCIAL

## 2022-08-02 VITALS
RESPIRATION RATE: 20 BRPM | DIASTOLIC BLOOD PRESSURE: 63 MMHG | SYSTOLIC BLOOD PRESSURE: 107 MMHG | OXYGEN SATURATION: 95 % | HEART RATE: 89 BPM | TEMPERATURE: 97.4 F

## 2022-08-02 DIAGNOSIS — T40.601A OPIATE OVERDOSE, ACCIDENTAL OR UNINTENTIONAL, INITIAL ENCOUNTER (HCC): Primary | ICD-10-CM

## 2022-08-02 ASSESSMENT — ENCOUNTER SYMPTOMS
EYE PAIN: 0
SHORTNESS OF BREATH: 0
COLOR CHANGE: 0
ABDOMINAL PAIN: 0
BACK PAIN: 0

## 2022-08-02 ASSESSMENT — LIFESTYLE VARIABLES
HOW MANY STANDARD DRINKS CONTAINING ALCOHOL DO YOU HAVE ON A TYPICAL DAY: 7 TO 9
HOW OFTEN DO YOU HAVE A DRINK CONTAINING ALCOHOL: 4 OR MORE TIMES A WEEK
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
## 2022-08-02 NOTE — ED PROVIDER NOTES
EMERGENCY DEPARTMENT ENCOUNTER    Pt Name: Naty Walters  MRN: [id number]  [place] 1985  Date of evaluation: 8/2/22  CHIEF COMPLAINT       Chief Complaint   Patient presents with    Drug Overdose     Crack and fentanyl     HISTORY OF PRESENT ILLNESS   26-year-old male presents after overdose. Patient reports he has a history of cocaine abuse, states that he was coming down from cocaine and in attempt to help with his withdrawal symptoms he used fentanyl, patient reports that he does not normally use fentanyl, reportedly overdosed, was given Narcan on scene, 4 mg, patient then became alert and oriented, denies any current complaints    The history is provided by the patient. REVIEW OF SYSTEMS     Review of Systems   Constitutional:  Negative for chills and fever. HENT:  Negative for congestion and ear pain. Eyes:  Negative for pain. Respiratory:  Negative for shortness of breath. Cardiovascular:  Negative for chest pain, palpitations and leg swelling. Gastrointestinal:  Negative for abdominal pain. Genitourinary:  Negative for dysuria and flank pain. Musculoskeletal:  Negative for back pain. Skin:  Negative for color change. Neurological:  Negative for numbness and headaches. Psychiatric/Behavioral:  Negative for confusion. All other systems reviewed and are negative. PASTMEDICAL HISTORY     Past Medical History:   Diagnosis Date    Asthma      Past Problem List  Patient Active Problem List   Diagnosis Code    Acute sialoadenitis K11.21     SURGICAL HISTORY     History reviewed. No pertinent surgical history.   CURRENT MEDICATIONS       Discharge Medication List as of 8/2/2022  5:17 AM        CONTINUE these medications which have NOT CHANGED    Details   !! ibuprofen (IBU) 400 MG tablet Take 1 tablet by mouth every 6 hours as needed for Pain, Disp-20 tablet, R-0Print      orphenadrine (NORFLEX) 100 MG extended release tablet Take 1 tablet by mouth 2 times daily, Disp-14 tablet, R-0Print      lidocaine (LIDODERM) 5 % Place 1 patch onto the skin daily 12 hours on, 12 hours off., Disp-30 patch, R-0Print      !! ibuprofen (ADVIL;MOTRIN) 800 MG tablet Take 1 tablet by mouth every 8 hours as needed for Pain, Disp-21 tablet, R-0      sertraline (ZOLOFT) 25 MG tablet Take 25 mg by mouth nightly      lurasidone (LATUDA) 40 MG TABS tablet Take 40 mg by mouth daily      fluticasone (FLONASE) 50 MCG/ACT nasal spray 1 spray by Nasal route 2 times daily, Disp-1 Bottle, R-0      pseudoephedrine (SUDAFED) 60 MG tablet Take 1 tablet by mouth every 6 hours as needed for Congestion, Disp-20 tablet, R-0      albuterol (PROVENTIL) (2.5 MG/3ML) 0.083% nebulizer solution Take 2.5 mg by nebulization every 6 hours as needed for Wheezing. ALPRAZolam (XANAX) 0.25 MG tablet Take 1 mg by mouth 3 times daily as needed for Sleep        !! - Potential duplicate medications found. Please discuss with provider. ALLERGIES     is allergic to endocet [oxycodone-acetaminophen]. FAMILY HISTORY     has no family status information on file. SOCIAL HISTORY       Social History     Tobacco Use    Smoking status: Every Day     Packs/day: 2.00     Types: Cigarettes    Smokeless tobacco: Former     Types: Chew    Tobacco comments:     reports using 1 can in 3 days   Substance Use Topics    Alcohol use: Yes     Comment: social    Drug use: Yes     Comment: crack/fentanyl     PHYSICAL EXAM     INITIAL VITALS: /63   Pulse 89   Temp 97.4 °F (36.3 °C) (Oral)   Resp 20   SpO2 95%    Physical Exam  Vitals and nursing note reviewed. Constitutional:       General: He is not in acute distress. Appearance: Normal appearance. He is not ill-appearing. HENT:      Head: Normocephalic and atraumatic.       Right Ear: External ear normal.      Left Ear: External ear normal.      Nose: Nose normal.      Mouth/Throat:      Mouth: Mucous membranes are moist.   Eyes:      Extraocular Movements: Extraocular movements intact. Pupils: Pupils are equal, round, and reactive to light. Cardiovascular:      Rate and Rhythm: Normal rate and regular rhythm. Pulses: Normal pulses. Heart sounds: Normal heart sounds. Pulmonary:      Effort: Pulmonary effort is normal.      Breath sounds: Normal breath sounds. Abdominal:      General: Abdomen is flat. Palpations: Abdomen is soft. Tenderness: There is no abdominal tenderness. Musculoskeletal:         General: No tenderness. Normal range of motion. Cervical back: Neck supple. No spinous process tenderness or muscular tenderness. Skin:     General: Skin is warm and dry. Capillary Refill: Capillary refill takes less than 2 seconds. Neurological:      General: No focal deficit present. Mental Status: He is alert and oriented to person, place, and time. Cranial Nerves: Cranial nerves are intact. Sensory: Sensation is intact. Motor: Motor function is intact. Psychiatric:         Behavior: Behavior normal.         Thought Content: Thought content does not include homicidal or suicidal ideation. MEDICAL DECISION MAKIN-year-old male presents after overdose. On initial exam patient in no acute distress, vitals are stable, will observe in ED    Patient was observed in ED for period of time, no repeat dosing of Narcan was needed, patient went home Narcan kit, discussed with patient need for follow-up with PCP and return precautions, patient voiced understanding and is comfortable with plan and discharge home    Patient/Guardian was informed of their diagnosis and told to follow up with PCP  in 1-3 days. Patient demonstrates understanding and agreement with the plan. They were given the opportunity to ask questions and those questions were answered to the best of our ability with the available information. Patient/Guardian told to return to the ED for any new, worsening, changing or persistent symptoms.        This dictation was prepared using [employer] voice recognition software. CRITICAL CARE:       PROCEDURES:    Procedures    DIAGNOSTIC RESULTS   EKG:All EKG's are interpreted by the Emergency Department Physician who either signs or Co-signs this chart in the absence of a cardiologist.        RADIOLOGY:All plain film, CT, MRI, and formal ultrasound images (except ED bedside ultrasound) are read by the radiologist, see reports below, unless otherwisenoted in MDM or here. No orders to display     LABS: All lab results were reviewed by myself, and all abnormals are listed below. Labs Reviewed - No data to display    EMERGENCY DEPARTMENTCOURSE:         Vitals:    Vitals:    08/02/22 0001   BP: 107/63   Pulse: 89   Resp: 20   Temp: 97.4 °F (36.3 °C)   TempSrc: Oral   SpO2: 95%       The patient was given the following medications while in the emergency department:  Orders Placed This Encounter   Medications    NALOXONE OPIATE OVERDOSE KIT 1 each     CONSULTS:  None    FINAL IMPRESSION      1. Opiate overdose, accidental or unintentional, initial encounter [facility])          DISPOSITION/PLAN   DISPOSITION Decision To Discharge 08/02/2022 04:09:24 AM      PATIENT REFERRED TO:  Sandie White  [address] [place] [address] [address] DEW[address]  [phone]    Schedule an appointment as soon as possible for a visit       [facility] ED  [address]  [phone]    If symptoms worsen, As needed    DISCHARGE MEDICATIONS:  Discharge Medication List as of 8/2/2022  5:17 AM        The care is provided during an unprecedented national emergency due to the novel coronavirus, COVID 19.   [address], DO                     [address], DO  08/02/22 1414

## 2022-08-25 ENCOUNTER — APPOINTMENT (OUTPATIENT)
Dept: CT IMAGING | Age: 37
DRG: 135 | End: 2022-08-25
Payer: COMMERCIAL

## 2022-08-25 ENCOUNTER — HOSPITAL ENCOUNTER (INPATIENT)
Age: 37
LOS: 1 days | Discharge: HOME | DRG: 135 | End: 2022-08-27
Attending: STUDENT IN AN ORGANIZED HEALTH CARE EDUCATION/TRAINING PROGRAM | Admitting: SURGERY
Payer: COMMERCIAL

## 2022-08-25 ENCOUNTER — APPOINTMENT (OUTPATIENT)
Dept: GENERAL RADIOLOGY | Age: 37
DRG: 135 | End: 2022-08-25
Payer: COMMERCIAL

## 2022-08-25 DIAGNOSIS — T40.601A OPIATE OVERDOSE, ACCIDENTAL OR UNINTENTIONAL, INITIAL ENCOUNTER (HCC): ICD-10-CM

## 2022-08-25 DIAGNOSIS — S29.9XXA RIB INJURY: ICD-10-CM

## 2022-08-25 DIAGNOSIS — F14.10 COCAINE ABUSE (HCC): ICD-10-CM

## 2022-08-25 DIAGNOSIS — R09.02 HYPOXIA: ICD-10-CM

## 2022-08-25 DIAGNOSIS — S22.42XA CLOSED FRACTURE OF MULTIPLE RIBS OF LEFT SIDE, INITIAL ENCOUNTER: Primary | ICD-10-CM

## 2022-08-25 LAB
ABSOLUTE BANDS #: 0.38 K/UL (ref 0–1)
ABSOLUTE EOS #: 0.25 K/UL (ref 0–0.4)
ABSOLUTE LYMPH #: 3.75 K/UL (ref 1–4.8)
ABSOLUTE MONO #: 0.63 K/UL (ref 0.1–1.3)
ALBUMIN SERPL-MCNC: 4.4 G/DL (ref 3.5–5.2)
ALP BLD-CCNC: 128 U/L (ref 40–129)
ALT SERPL-CCNC: 21 U/L (ref 5–41)
ANION GAP SERPL CALCULATED.3IONS-SCNC: 25 MMOL/L (ref 9–17)
AST SERPL-CCNC: 27 U/L
ATYPICAL LYMPHOCYTE ABSOLUTE COUNT: 0.13 K/UL
ATYPICAL LYMPHOCYTES: 1 %
BANDS: 3 % (ref 0–10)
BASOPHILS # BLD: 1 % (ref 0–2)
BASOPHILS ABSOLUTE: 0.13 K/UL (ref 0–0.2)
BILIRUB SERPL-MCNC: 0.68 MG/DL (ref 0.3–1.2)
BUN BLDV-MCNC: 13 MG/DL (ref 6–20)
CALCIUM SERPL-MCNC: 9.1 MG/DL (ref 8.6–10.4)
CHLORIDE BLD-SCNC: 102 MMOL/L (ref 98–107)
CO2: 12 MMOL/L (ref 20–31)
CREAT SERPL-MCNC: 1.23 MG/DL (ref 0.7–1.2)
EOSINOPHILS RELATIVE PERCENT: 2 % (ref 0–4)
ETHANOL PERCENT: 0.04 %
ETHANOL: 44 MG/DL
GFR AFRICAN AMERICAN: >60 ML/MIN
GFR NON-AFRICAN AMERICAN: >60 ML/MIN
GFR SERPL CREATININE-BSD FRML MDRD: ABNORMAL ML/MIN/{1.73_M2}
GLUCOSE BLD-MCNC: 158 MG/DL (ref 70–99)
HCT VFR BLD CALC: 46.8 % (ref 41–53)
HEMOGLOBIN: 15.6 G/DL (ref 13.5–17.5)
INR BLD: 1.1
LIPASE: 34 U/L (ref 13–60)
LYMPHOCYTES # BLD: 30 % (ref 24–44)
MAGNESIUM: 2.5 MG/DL (ref 1.6–2.6)
MCH RBC QN AUTO: 31.1 PG (ref 26–34)
MCHC RBC AUTO-ENTMCNC: 33.3 G/DL (ref 31–37)
MCV RBC AUTO: 93.5 FL (ref 80–100)
METAMYELOCYTES ABSOLUTE COUNT: 0.25 K/UL
METAMYELOCYTES: 2 %
MONOCYTES # BLD: 5 % (ref 1–7)
MORPHOLOGY: NORMAL
MYELOCYTES ABSOLUTE COUNT: 0.13 K/UL
MYELOCYTES: 1 %
PDW BLD-RTO: 14.4 % (ref 11.5–14.9)
PLATELET # BLD: 383 K/UL (ref 150–450)
PMV BLD AUTO: 6.6 FL (ref 6–12)
POTASSIUM SERPL-SCNC: 3.8 MMOL/L (ref 3.7–5.3)
PROTHROMBIN TIME: 13.9 SEC (ref 11.8–14.6)
RBC # BLD: 5.01 M/UL (ref 4.5–5.9)
SEG NEUTROPHILS: 55 % (ref 36–66)
SEGMENTED NEUTROPHILS ABSOLUTE COUNT: 6.85 K/UL (ref 1.3–9.1)
SODIUM BLD-SCNC: 139 MMOL/L (ref 135–144)
TOTAL CK: 247 U/L (ref 39–308)
TOTAL PROTEIN: 7 G/DL (ref 6.4–8.3)
TROPONIN, HIGH SENSITIVITY: 13 NG/L (ref 0–22)
WBC # BLD: 12.5 K/UL (ref 3.5–11)

## 2022-08-25 PROCEDURE — 80053 COMPREHEN METABOLIC PANEL: CPT

## 2022-08-25 PROCEDURE — 84484 ASSAY OF TROPONIN QUANT: CPT

## 2022-08-25 PROCEDURE — 85610 PROTHROMBIN TIME: CPT

## 2022-08-25 PROCEDURE — 83735 ASSAY OF MAGNESIUM: CPT

## 2022-08-25 PROCEDURE — G0480 DRUG TEST DEF 1-7 CLASSES: HCPCS

## 2022-08-25 PROCEDURE — 72125 CT NECK SPINE W/O DYE: CPT

## 2022-08-25 PROCEDURE — 83690 ASSAY OF LIPASE: CPT

## 2022-08-25 PROCEDURE — 85025 COMPLETE CBC W/AUTO DIFF WBC: CPT

## 2022-08-25 PROCEDURE — 71045 X-RAY EXAM CHEST 1 VIEW: CPT

## 2022-08-25 PROCEDURE — 36415 COLL VENOUS BLD VENIPUNCTURE: CPT

## 2022-08-25 PROCEDURE — 82550 ASSAY OF CK (CPK): CPT

## 2022-08-25 PROCEDURE — 71260 CT THORAX DX C+: CPT

## 2022-08-25 PROCEDURE — 70450 CT HEAD/BRAIN W/O DYE: CPT

## 2022-08-25 PROCEDURE — 99285 EMERGENCY DEPT VISIT HI MDM: CPT

## 2022-08-25 RX ORDER — 0.9 % SODIUM CHLORIDE 0.9 %
100 INTRAVENOUS SOLUTION INTRAVENOUS ONCE
Status: COMPLETED | OUTPATIENT
Start: 2022-08-25 | End: 2022-08-26

## 2022-08-25 RX ORDER — 0.9 % SODIUM CHLORIDE 0.9 %
1000 INTRAVENOUS SOLUTION INTRAVENOUS ONCE
Status: DISCONTINUED | OUTPATIENT
Start: 2022-08-25 | End: 2022-08-26

## 2022-08-25 RX ORDER — ONDANSETRON 2 MG/ML
4 INJECTION INTRAMUSCULAR; INTRAVENOUS ONCE
Status: COMPLETED | OUTPATIENT
Start: 2022-08-25 | End: 2022-08-26

## 2022-08-25 RX ORDER — SODIUM CHLORIDE 0.9 % (FLUSH) 0.9 %
10 SYRINGE (ML) INJECTION PRN
Status: COMPLETED | OUTPATIENT
Start: 2022-08-25 | End: 2022-08-26

## 2022-08-25 ASSESSMENT — ENCOUNTER SYMPTOMS
EYE DISCHARGE: 0
CHEST TIGHTNESS: 0
DIARRHEA: 0
NAUSEA: 0
SORE THROAT: 0
SHORTNESS OF BREATH: 0
RHINORRHEA: 0
EYE REDNESS: 0
VOMITING: 0
ABDOMINAL PAIN: 0

## 2022-08-25 ASSESSMENT — LIFESTYLE VARIABLES
HOW OFTEN DO YOU HAVE A DRINK CONTAINING ALCOHOL: 4 OR MORE TIMES A WEEK
HOW MANY STANDARD DRINKS CONTAINING ALCOHOL DO YOU HAVE ON A TYPICAL DAY: 10 OR MORE

## 2022-08-26 PROBLEM — T14.90XA TRAUMA: Status: ACTIVE | Noted: 2022-08-26

## 2022-08-26 PROBLEM — S29.9XXA RIB INJURY: Status: ACTIVE | Noted: 2022-08-26

## 2022-08-26 LAB
AMPHETAMINE SCREEN URINE: POSITIVE
BACTERIA: NORMAL
BARBITURATE SCREEN URINE: NEGATIVE
BENZODIAZEPINE SCREEN, URINE: NEGATIVE
BILIRUBIN URINE: NEGATIVE
CANNABINOID SCREEN URINE: NEGATIVE
CASTS UA: NORMAL /LPF
COCAINE METABOLITE, URINE: POSITIVE
COLOR: YELLOW
EPITHELIAL CELLS UA: NORMAL /HPF
FENTANYL URINE: POSITIVE
GLUCOSE URINE: NEGATIVE
KETONES, URINE: ABNORMAL
LEUKOCYTE ESTERASE, URINE: NEGATIVE
METHADONE SCREEN, URINE: NEGATIVE
NITRITE, URINE: NEGATIVE
OPIATES, URINE: NEGATIVE
OXYCODONE SCREEN URINE: NEGATIVE
PH UA: 5 (ref 5–8)
PHENCYCLIDINE, URINE: NEGATIVE
PROTEIN UA: ABNORMAL
RBC UA: NORMAL /HPF
SPECIFIC GRAVITY UA: 1.06 (ref 1–1.03)
TEST INFORMATION: ABNORMAL
TROPONIN, HIGH SENSITIVITY: 44 NG/L (ref 0–22)
TURBIDITY: CLEAR
URINE HGB: NEGATIVE
UROBILINOGEN, URINE: NORMAL
WBC UA: NORMAL /HPF

## 2022-08-26 PROCEDURE — 2580000003 HC RX 258: Performed by: STUDENT IN AN ORGANIZED HEALTH CARE EDUCATION/TRAINING PROGRAM

## 2022-08-26 PROCEDURE — 6370000000 HC RX 637 (ALT 250 FOR IP): Performed by: SURGERY

## 2022-08-26 PROCEDURE — 6370000000 HC RX 637 (ALT 250 FOR IP): Performed by: STUDENT IN AN ORGANIZED HEALTH CARE EDUCATION/TRAINING PROGRAM

## 2022-08-26 PROCEDURE — 80307 DRUG TEST PRSMV CHEM ANLYZR: CPT

## 2022-08-26 PROCEDURE — 2060000000 HC ICU INTERMEDIATE R&B

## 2022-08-26 PROCEDURE — 6360000004 HC RX CONTRAST MEDICATION: Performed by: STUDENT IN AN ORGANIZED HEALTH CARE EDUCATION/TRAINING PROGRAM

## 2022-08-26 PROCEDURE — 81001 URINALYSIS AUTO W/SCOPE: CPT

## 2022-08-26 PROCEDURE — 99223 1ST HOSP IP/OBS HIGH 75: CPT | Performed by: INTERNAL MEDICINE

## 2022-08-26 PROCEDURE — 36415 COLL VENOUS BLD VENIPUNCTURE: CPT

## 2022-08-26 PROCEDURE — 6360000002 HC RX W HCPCS: Performed by: STUDENT IN AN ORGANIZED HEALTH CARE EDUCATION/TRAINING PROGRAM

## 2022-08-26 PROCEDURE — 94761 N-INVAS EAR/PLS OXIMETRY MLT: CPT

## 2022-08-26 PROCEDURE — 84484 ASSAY OF TROPONIN QUANT: CPT

## 2022-08-26 PROCEDURE — 2700000000 HC OXYGEN THERAPY PER DAY

## 2022-08-26 RX ORDER — ASPIRIN 81 MG/1
324 TABLET, CHEWABLE ORAL ONCE
Status: COMPLETED | OUTPATIENT
Start: 2022-08-26 | End: 2022-08-26

## 2022-08-26 RX ORDER — SODIUM CHLORIDE 0.9 % (FLUSH) 0.9 %
5-40 SYRINGE (ML) INJECTION PRN
Status: DISCONTINUED | OUTPATIENT
Start: 2022-08-26 | End: 2022-08-27 | Stop reason: HOSPADM

## 2022-08-26 RX ORDER — LIDOCAINE 4 G/G
1 PATCH TOPICAL DAILY
Status: DISCONTINUED | OUTPATIENT
Start: 2022-08-26 | End: 2022-08-27 | Stop reason: HOSPADM

## 2022-08-26 RX ORDER — SODIUM CHLORIDE 0.9 % (FLUSH) 0.9 %
5-40 SYRINGE (ML) INJECTION EVERY 12 HOURS SCHEDULED
Status: DISCONTINUED | OUTPATIENT
Start: 2022-08-26 | End: 2022-08-27 | Stop reason: HOSPADM

## 2022-08-26 RX ORDER — ENOXAPARIN SODIUM 100 MG/ML
40 INJECTION SUBCUTANEOUS DAILY
Status: DISCONTINUED | OUTPATIENT
Start: 2022-08-26 | End: 2022-08-26 | Stop reason: SDUPTHER

## 2022-08-26 RX ORDER — CYCLOBENZAPRINE HCL 10 MG
5 TABLET ORAL 3 TIMES DAILY
Status: DISCONTINUED | OUTPATIENT
Start: 2022-08-26 | End: 2022-08-27 | Stop reason: HOSPADM

## 2022-08-26 RX ORDER — POLYETHYLENE GLYCOL 3350 17 G/17G
17 POWDER, FOR SOLUTION ORAL DAILY
Status: DISCONTINUED | OUTPATIENT
Start: 2022-08-26 | End: 2022-08-27 | Stop reason: HOSPADM

## 2022-08-26 RX ORDER — KETOROLAC TROMETHAMINE 30 MG/ML
30 INJECTION, SOLUTION INTRAMUSCULAR; INTRAVENOUS ONCE
Status: COMPLETED | OUTPATIENT
Start: 2022-08-26 | End: 2022-08-26

## 2022-08-26 RX ORDER — ACETAMINOPHEN 500 MG
1000 TABLET ORAL EVERY 8 HOURS SCHEDULED
Status: DISCONTINUED | OUTPATIENT
Start: 2022-08-26 | End: 2022-08-27 | Stop reason: HOSPADM

## 2022-08-26 RX ORDER — METHOCARBAMOL 500 MG/1
1000 TABLET, FILM COATED ORAL ONCE
Status: COMPLETED | OUTPATIENT
Start: 2022-08-26 | End: 2022-08-26

## 2022-08-26 RX ORDER — OXYCODONE HYDROCHLORIDE 5 MG/1
5 TABLET ORAL EVERY 6 HOURS PRN
Status: DISCONTINUED | OUTPATIENT
Start: 2022-08-26 | End: 2022-08-26

## 2022-08-26 RX ORDER — ACETAMINOPHEN 500 MG
1000 TABLET ORAL ONCE
Status: COMPLETED | OUTPATIENT
Start: 2022-08-26 | End: 2022-08-26

## 2022-08-26 RX ORDER — BISACODYL 10 MG
10 SUPPOSITORY, RECTAL RECTAL DAILY
Status: DISCONTINUED | OUTPATIENT
Start: 2022-08-26 | End: 2022-08-27 | Stop reason: HOSPADM

## 2022-08-26 RX ORDER — ENOXAPARIN SODIUM 100 MG/ML
40 INJECTION SUBCUTANEOUS DAILY
Status: DISCONTINUED | OUTPATIENT
Start: 2022-08-26 | End: 2022-08-27 | Stop reason: HOSPADM

## 2022-08-26 RX ORDER — SODIUM CHLORIDE 9 MG/ML
INJECTION, SOLUTION INTRAVENOUS PRN
Status: DISCONTINUED | OUTPATIENT
Start: 2022-08-26 | End: 2022-08-27 | Stop reason: HOSPADM

## 2022-08-26 RX ORDER — ENOXAPARIN SODIUM 100 MG/ML
30 INJECTION SUBCUTANEOUS 2 TIMES DAILY
Status: DISCONTINUED | OUTPATIENT
Start: 2022-08-26 | End: 2022-08-26 | Stop reason: SDUPTHER

## 2022-08-26 RX ORDER — ONDANSETRON 4 MG/1
4 TABLET, ORALLY DISINTEGRATING ORAL EVERY 8 HOURS PRN
Status: DISCONTINUED | OUTPATIENT
Start: 2022-08-26 | End: 2022-08-27 | Stop reason: HOSPADM

## 2022-08-26 RX ORDER — LIDOCAINE 4 G/G
1 PATCH TOPICAL ONCE
Status: COMPLETED | OUTPATIENT
Start: 2022-08-26 | End: 2022-08-26

## 2022-08-26 RX ORDER — SODIUM PHOSPHATE, DIBASIC AND SODIUM PHOSPHATE, MONOBASIC 7; 19 G/133ML; G/133ML
1 ENEMA RECTAL DAILY PRN
Status: DISCONTINUED | OUTPATIENT
Start: 2022-08-26 | End: 2022-08-27 | Stop reason: HOSPADM

## 2022-08-26 RX ORDER — ONDANSETRON 2 MG/ML
4 INJECTION INTRAMUSCULAR; INTRAVENOUS EVERY 6 HOURS PRN
Status: DISCONTINUED | OUTPATIENT
Start: 2022-08-26 | End: 2022-08-27 | Stop reason: HOSPADM

## 2022-08-26 RX ORDER — KETOROLAC TROMETHAMINE 30 MG/ML
15 INJECTION, SOLUTION INTRAMUSCULAR; INTRAVENOUS EVERY 6 HOURS
Status: DISCONTINUED | OUTPATIENT
Start: 2022-08-26 | End: 2022-08-27 | Stop reason: HOSPADM

## 2022-08-26 RX ORDER — ALBUTEROL SULFATE 2.5 MG/3ML
2.5 SOLUTION RESPIRATORY (INHALATION) EVERY 6 HOURS PRN
Status: DISCONTINUED | OUTPATIENT
Start: 2022-08-26 | End: 2022-08-27 | Stop reason: HOSPADM

## 2022-08-26 RX ORDER — ACETAMINOPHEN 325 MG/1
650 TABLET ORAL EVERY 4 HOURS PRN
Status: DISCONTINUED | OUTPATIENT
Start: 2022-08-26 | End: 2022-08-26

## 2022-08-26 RX ADMIN — METHOCARBAMOL 1000 MG: 500 TABLET ORAL at 03:21

## 2022-08-26 RX ADMIN — KETOROLAC TROMETHAMINE 15 MG: 30 INJECTION, SOLUTION INTRAMUSCULAR; INTRAVENOUS at 20:23

## 2022-08-26 RX ADMIN — SODIUM CHLORIDE 1000 ML: 9 INJECTION, SOLUTION INTRAVENOUS at 01:43

## 2022-08-26 RX ADMIN — KETOROLAC TROMETHAMINE 15 MG: 30 INJECTION, SOLUTION INTRAMUSCULAR; INTRAVENOUS at 10:02

## 2022-08-26 RX ADMIN — KETOROLAC TROMETHAMINE 30 MG: 30 INJECTION, SOLUTION INTRAMUSCULAR; INTRAVENOUS at 02:00

## 2022-08-26 RX ADMIN — CYCLOBENZAPRINE 5 MG: 10 TABLET, FILM COATED ORAL at 15:30

## 2022-08-26 RX ADMIN — CYCLOBENZAPRINE 5 MG: 10 TABLET, FILM COATED ORAL at 10:02

## 2022-08-26 RX ADMIN — ONDANSETRON 4 MG: 2 INJECTION INTRAMUSCULAR; INTRAVENOUS at 01:22

## 2022-08-26 RX ADMIN — SODIUM CHLORIDE, PRESERVATIVE FREE 10 ML: 5 INJECTION INTRAVENOUS at 10:10

## 2022-08-26 RX ADMIN — ACETAMINOPHEN 1000 MG: 500 TABLET ORAL at 06:18

## 2022-08-26 RX ADMIN — KETOROLAC TROMETHAMINE 15 MG: 30 INJECTION, SOLUTION INTRAMUSCULAR; INTRAVENOUS at 15:31

## 2022-08-26 RX ADMIN — SODIUM CHLORIDE, PRESERVATIVE FREE 10 ML: 5 INJECTION INTRAVENOUS at 00:02

## 2022-08-26 RX ADMIN — ASPIRIN 324 MG: 81 TABLET, CHEWABLE ORAL at 04:53

## 2022-08-26 RX ADMIN — ENOXAPARIN SODIUM 40 MG: 100 INJECTION SUBCUTANEOUS at 10:05

## 2022-08-26 RX ADMIN — ACETAMINOPHEN 1000 MG: 500 TABLET ORAL at 01:58

## 2022-08-26 RX ADMIN — ACETAMINOPHEN 1000 MG: 500 TABLET ORAL at 15:31

## 2022-08-26 RX ADMIN — CYCLOBENZAPRINE 5 MG: 10 TABLET, FILM COATED ORAL at 20:24

## 2022-08-26 RX ADMIN — IOPAMIDOL 75 ML: 755 INJECTION, SOLUTION INTRAVENOUS at 00:03

## 2022-08-26 RX ADMIN — SODIUM CHLORIDE 100 ML: 9 INJECTION, SOLUTION INTRAVENOUS at 00:03

## 2022-08-26 RX ADMIN — ENOXAPARIN SODIUM 40 MG: 100 INJECTION SUBCUTANEOUS at 04:57

## 2022-08-26 RX ADMIN — SODIUM CHLORIDE, PRESERVATIVE FREE 10 ML: 5 INJECTION INTRAVENOUS at 20:27

## 2022-08-26 RX ADMIN — ACETAMINOPHEN 1000 MG: 500 TABLET ORAL at 20:25

## 2022-08-26 ASSESSMENT — PAIN DESCRIPTION - DESCRIPTORS
DESCRIPTORS: ACHING
DESCRIPTORS: DISCOMFORT
DESCRIPTORS: ACHING
DESCRIPTORS: SHARP;STABBING
DESCRIPTORS: ACHING
DESCRIPTORS: SHARP
DESCRIPTORS: ACHING

## 2022-08-26 ASSESSMENT — PAIN DESCRIPTION - ORIENTATION
ORIENTATION: LEFT
ORIENTATION: LEFT;UPPER
ORIENTATION: LEFT
ORIENTATION: LEFT
ORIENTATION: LEFT;UPPER

## 2022-08-26 ASSESSMENT — PAIN DESCRIPTION - LOCATION
LOCATION: CHEST
LOCATION: RIB CAGE
LOCATION: CHEST
LOCATION: RIB CAGE
LOCATION: CHEST
LOCATION: CHEST
LOCATION: RIB CAGE
LOCATION: RIB CAGE
LOCATION: CHEST
LOCATION: RIB CAGE
LOCATION: RIB CAGE

## 2022-08-26 ASSESSMENT — PAIN SCALES - GENERAL
PAINLEVEL_OUTOF10: 7
PAINLEVEL_OUTOF10: 5
PAINLEVEL_OUTOF10: 8
PAINLEVEL_OUTOF10: 10
PAINLEVEL_OUTOF10: 8
PAINLEVEL_OUTOF10: 10
PAINLEVEL_OUTOF10: 10
PAINLEVEL_OUTOF10: 3
PAINLEVEL_OUTOF10: 3
PAINLEVEL_OUTOF10: 10
PAINLEVEL_OUTOF10: 7
PAINLEVEL_OUTOF10: 8

## 2022-08-26 ASSESSMENT — PAIN - FUNCTIONAL ASSESSMENT
PAIN_FUNCTIONAL_ASSESSMENT: PREVENTS OR INTERFERES SOME ACTIVE ACTIVITIES AND ADLS
PAIN_FUNCTIONAL_ASSESSMENT: 0-10

## 2022-08-26 ASSESSMENT — PAIN DESCRIPTION - FREQUENCY: FREQUENCY: CONTINUOUS

## 2022-08-26 ASSESSMENT — PAIN DESCRIPTION - PAIN TYPE: TYPE: ACUTE PAIN

## 2022-08-26 ASSESSMENT — PAIN DESCRIPTION - ONSET: ONSET: ON-GOING

## 2022-08-26 NOTE — PROGRESS NOTES
Dr Starkey Skillman aware of consult from Dr Rolando Brooks, spoke with him in the renae and answered questions.

## 2022-08-26 NOTE — ED PROVIDER NOTES
EMERGENCY DEPARTMENT ENCOUNTER    Pt Name: Norah Nugent  MRN: [id number]  [place] 1985  Date of evaluation: 8/25/22  CHIEF COMPLAINT       Chief Complaint   Patient presents with    Drug Overdose     HISTORY OF PRESENT ILLNESS   28-year-old male presents with reported overdose     Patient states that he was using what he believes was fentanyl    He was snorting this    EMS reports that there may have been some bystander CPR for agonal breathing and that he may have had a fall    Patient does not remember this    He was given 4 of intranasal Narcan and 2 of IV Narcan and is now awake and breathing does not remember what happened    He states he has some pain in his nose where he had a nasal airway placed    He denies pain elsewhere                    REVIEW OF SYSTEMS     Review of Systems   Constitutional:  Negative for chills and fever. HENT:  Negative for rhinorrhea and sore throat. Eyes:  Negative for discharge and redness. Respiratory:  Negative for chest tightness and shortness of breath. Cardiovascular:  Negative for chest pain. Gastrointestinal:  Negative for abdominal pain, diarrhea, nausea and vomiting. Genitourinary:  Negative for dysuria and frequency. Musculoskeletal:  Negative for arthralgias and myalgias. Skin:  Negative for rash. Neurological:  Negative for weakness and numbness. Psychiatric/Behavioral:  Negative for suicidal ideas. All other systems reviewed and are negative. PASTMEDICAL HISTORY     Past Medical History:   Diagnosis Date    Asthma      Past Problem List  Patient Active Problem List   Diagnosis Code    Acute sialoadenitis K11.21    Rib injury S29. 9XXA     SURGICAL HISTORY     History reviewed. No pertinent surgical history. CURRENT MEDICATIONS       Previous Medications    ALBUTEROL (PROVENTIL) (2.5 MG/3ML) 0.083% NEBULIZER SOLUTION    Take 2.5 mg by nebulization every 6 hours as needed for Wheezing.     ALPRAZOLAM (XANAX) 0.25 MG TABLET    Take 1 mg by mouth 3 times daily as needed for Sleep     FLUTICASONE (FLONASE) 50 MCG/ACT NASAL SPRAY    1 spray by Nasal route 2 times daily    IBUPROFEN (ADVIL;MOTRIN) 800 MG TABLET    Take 1 tablet by mouth every 8 hours as needed for Pain    IBUPROFEN (IBU) 400 MG TABLET    Take 1 tablet by mouth every 6 hours as needed for Pain    LIDOCAINE (LIDODERM) 5 %    Place 1 patch onto the skin daily 12 hours on, 12 hours off. LURASIDONE (LATUDA) 40 MG TABS TABLET    Take 40 mg by mouth daily    ORPHENADRINE (NORFLEX) 100 MG EXTENDED RELEASE TABLET    Take 1 tablet by mouth 2 times daily    PSEUDOEPHEDRINE (SUDAFED) 60 MG TABLET    Take 1 tablet by mouth every 6 hours as needed for Congestion    SERTRALINE (ZOLOFT) 25 MG TABLET    Take 25 mg by mouth nightly     ALLERGIES     is allergic to endocet [oxycodone-acetaminophen]. FAMILY HISTORY     has no family status information on file. SOCIAL HISTORY       Social History     Tobacco Use    Smoking status: Every Day     Packs/day: 1.00     Types: Cigarettes    Smokeless tobacco: Former     Types: Chew    Tobacco comments:     reports using 1 can in 3 days   Substance Use Topics    Alcohol use: Yes     Comment: daily    Drug use: Yes     Types: Marijuana (Weed), Cocaine, Methamphetamines (Crystal Meth)     Comment: crack/fentanyl     PHYSICAL EXAM     INITIAL VITALS: /80   Pulse 72   Temp 97.5 °F (36.4 °C) (Oral)   Resp 16   SpO2 97%    Physical Exam  Vitals and nursing note reviewed. Constitutional:       Appearance: Normal appearance. He is diaphoretic. HENT:      Head: Normocephalic and atraumatic. Nose: Nose normal.      Comments: Blood in the left Jonatan     Mouth/Throat:      Mouth: Mucous membranes are moist.   Eyes:      Conjunctiva/sclera: Conjunctivae normal.      Pupils: Pupils are equal, round, and reactive to light. Cardiovascular:      Rate and Rhythm: Normal rate and regular rhythm. Pulses: Normal pulses.       Heart sounds: Normal heart sounds. Pulmonary:      Effort: Pulmonary effort is normal.      Breath sounds: Normal breath sounds. Abdominal:      Palpations: Abdomen is soft. Tenderness: There is no abdominal tenderness. Musculoskeletal:         General: No swelling or deformity. Cervical back: Normal range of motion. Skin:     General: Skin is warm. Findings: No rash. Neurological:      General: No focal deficit present. Mental Status: He is alert and oriented to person, place, and time.    Psychiatric:         Mood and Affect: Mood normal.       MEDICAL DECISION MAKIN-year-old presents after a reported fentanyl overdose    Now he is talking breathing not apneic no longer with opioid toxidrome    However very diaphoretic will obtain broad work-up and CT scans  ED Course as of 22 0124   u Aug 25, 2022   2324 CBC with Auto Differential(!):    WBC 12.5(!)   RBC 5.01   Hemoglobin Quant 15.6   Hematocrit 46.8   MCV 93.5   MCH 31.1   MCHC 33.3   RDW 14.4   Platelet Count 526   MPV 6.6   Seg Neutrophils PENDING   Lymphocytes PENDING   Monocytes PENDING   Eosinophils % PENDING   Basophils PENDING   Segs Absolute PENDING   Absolute Lymph # PENDING   Absolute Mono # PENDING   Absolute Eos # PENDING   Basophils Absolute PENDING  Leukocytosis of 12.5 otherwise unremarkable [CHELLE]   2325 CMP(!):    Glucose, Random 158(!)   BUN,BUNPL 13   Creatinine 1.23(!)   CALCIUM, SERUM, 277971 9.1   Sodium 139   Potassium 3.8   Chloride 102   CO2 12(!)   Anion Gap 25(!)   Alk Phos 128   ALT 21   AST 27   Bilirubin 0.68   Total Protein 7.0   Albumin 4.4   GFR Non- >60   GFR  >60   GFR Comment       Slightly elevated creatinine at 1.23 otherwise unremarkable [CHELLE]   2325 Magnesium:    Magnesium 2.5  Normal [CHELLE]   2325 Lipase:    Lipase 34  Normal [CHELLE]   2325 Troponin:    Troponin, High Sensitivity 13  Normal [CHELLE]   2325 ETOH(!):    ETHANOL,ETHA 44(!)   Ethanol percent 0.044  Mildly elevated but not intoxicated [CHELLE]   Fri Aug 26, 2022   0001 CK: Total   Normal [CHELLE]   0002 XR CHEST PORTABLE  Normal [CHELLE]   0043 CT Head W/O Contrast  Normal [CHELLE]   0043 CT CSpine W/O Contrast  Normal [CHELLE]   0043 CT CHEST ABDOMEN PELVIS W CONTRAST  3 rib fractures. No pneumothorax or hemothorax [CHELLE]      ED Course User Index  [CHELLE] Jamari Lara MD       CRITICAL CARE:     Due to the immediate potential for life-threatening deterioration due to hypoxic respiratory failure , I spent 42 minutes providing critical care. This time is excluding time spent performing procedures. PROCEDURES:    Procedures    DIAGNOSTIC RESULTS   EKG:All EKG's are interpreted by the Emergency Department Physician who either signs or Co-signs this chart in the absence of a cardiologist.        RADIOLOGY:All plain film, CT, MRI, and formal ultrasound images (except ED bedside ultrasound) are read by the radiologist, see reports below, unless otherwisenoted in MDM or here. CT CSpine W/O Contrast   Preliminary Result   No evidence of intracranial hemorrhage or any other definable acute   intracranial abnormality. No evidence of acute fracture or malalignment in the cervical spine. Fractures at the anterior ends of the left 2nd, 3rd, and 4th ribs. No pneumothorax or pleural effusion. Mild pulmonary emphysema without any acute process in the lungs. Normal thoracic aorta and mediastinal structures. 1.8 cm size calculus in the gallbladder. No evidence of internal hemorrhage   or visceral injury in abdomen pelvis. Normal abdominal aorta. Mild increased fluid in the distal small bowel. Normal appendix visualized. No evidence of fracture in the visualized bony structure of the thoracic   spine, lumbar spine and pelvis. CT Head W/O Contrast   Preliminary Result   No evidence of intracranial hemorrhage or any other definable acute   intracranial abnormality.       No evidence of acute fracture or malalignment in the cervical spine. Fractures at the anterior ends of the left 2nd, 3rd, and 4th ribs. No pneumothorax or pleural effusion. Mild pulmonary emphysema without any acute process in the lungs. Normal thoracic aorta and mediastinal structures. 1.8 cm size calculus in the gallbladder. No evidence of internal hemorrhage   or visceral injury in abdomen pelvis. Normal abdominal aorta. Mild increased fluid in the distal small bowel. Normal appendix visualized. No evidence of fracture in the visualized bony structure of the thoracic   spine, lumbar spine and pelvis. CT CHEST ABDOMEN PELVIS W CONTRAST   Preliminary Result   No evidence of intracranial hemorrhage or any other definable acute   intracranial abnormality. No evidence of acute fracture or malalignment in the cervical spine. Fractures at the anterior ends of the left 2nd, 3rd, and 4th ribs. No pneumothorax or pleural effusion. Mild pulmonary emphysema without any acute process in the lungs. Normal thoracic aorta and mediastinal structures. 1.8 cm size calculus in the gallbladder. No evidence of internal hemorrhage   or visceral injury in abdomen pelvis. Normal abdominal aorta. Mild increased fluid in the distal small bowel. Normal appendix visualized. No evidence of fracture in the visualized bony structure of the thoracic   spine, lumbar spine and pelvis. XR CHEST PORTABLE   Final Result   No acute process. LABS: All lab results were reviewed by myself, and all abnormals are listed below.   Labs Reviewed   CBC WITH AUTO DIFFERENTIAL - Abnormal; Notable for the following components:       Result Value    WBC 12.5 (*)     Metamyelocytes 2 (*)     Myelocytes 1 (*)     Metamyelocytes Absolute 0.25 (*)     Myelocytes Absolute 0.13 (*)     All other components within normal limits   COMPREHENSIVE METABOLIC PANEL - Abnormal; Notable for the following components:    Glucose 158 (*)     Creatinine 1.23 (*)     CO2 12 (*)     Anion Gap 25 (*)     All other components within normal limits   ETHANOL - Abnormal; Notable for the following components:    Ethanol 44 (*)     All other components within normal limits   LIPASE   MAGNESIUM   PROTIME-INR   TROPONIN   CK   URINALYSIS   URINE DRUG SCREEN       EMERGENCY DEPARTMENTCOURSE:     44year-old that presented with opioid overdose    Bystander CPR    Found to have 3 rib fractures and is hypoxic with splinting    Alert and oriented no longer showing signs of opioid overdose but requiring oxygen    Admitted to trauma service    Vitals:    Vitals:    08/25/22 2243 08/25/22 2332 08/25/22 2333 08/26/22 0117   BP: 122/79   113/80   Pulse: 99   72   Resp: 20 16 16 16   Temp: 98.2 °F (36.8 °C)   97.5 °F (36.4 °C)   TempSrc: Oral   Oral   SpO2: 97% 90% 92% 97%       The patient was given the following medications while in the emergency department:  Orders Placed This Encounter   Medications    ondansetron (ZOFRAN) injection 4 mg    0.9 % sodium chloride bolus    0.9 % sodium chloride bolus    iopamidol (ISOVUE-370) 76 % injection 75 mL    sodium chloride flush 0.9 % injection 10 mL    sodium chloride flush 0.9 % injection 5-40 mL    sodium chloride flush 0.9 % injection 5-40 mL    0.9 % sodium chloride infusion    enoxaparin (LOVENOX) injection 40 mg     Order Specific Question:   Indication of Use     Answer:   Prophylaxis-DVT/PE    acetaminophen (TYLENOL) tablet 650 mg    OR Linked Order Group     ondansetron (ZOFRAN-ODT) disintegrating tablet 4 mg     ondansetron (ZOFRAN) injection 4 mg     CONSULTS:  IP CONSULT TO GENERAL SURGERY    FINAL IMPRESSION      1. Closed fracture of multiple ribs of left side, initial encounter    2. Opiate overdose, accidental or unintentional, initial encounter ([facility] [address]    3.  Hypoxia          DISPOSITION/PLAN   DISPOSITION Admitted 08/26/2022 01:17:16 AM      PATIENT REFERRED TO:  No follow-up provider specified. DISCHARGE MEDICATIONS:  New Prescriptions    No medications on file     The care is provided during an unprecedented national emergency due to the novel coronavirus, COVID 19.   MD Oumar Campuzano MD  08/26/22 3717

## 2022-08-26 NOTE — PLAN OF CARE
Problem: Pain  Goal: Verbalizes/displays adequate comfort level or baseline comfort level  8/26/2022 1547 by Zenon Arango RN  Outcome: Progressing   Pt medicated with pain medication prn. Assessed all pain characteristics including level, type, location, frequency, and onset. Non-pharmacologic interventions offered to pt as well. Pt states pain is tolerable at this time. Will continue to monitor.

## 2022-08-26 NOTE — CARE COORDINATION
CASE MANAGEMENT NOTE:    Admission Date:  8/25/2022 Louis Scott is a 44 y.o.  male    Admitted for : Trauma [T14.90XA]  Rib injury [S29. 9XXA]  Hypoxia [R09.02]  Closed fracture of multiple ribs of left side, initial encounter [S22.42XA]  Opiate overdose, accidental or unintentional, initial encounter ([facility] [address] [place]    Met with:  Patient    PCP:  [address]:  [employer]      Is patient alert and oriented at time of discussion:  Yes    Current Residence/ Living Arrangements:  in a hotel             Current Services PTA:  No    Does patient go to outpatient dialysis: No  If yes, location and chair time:   Who is their nephrologist?     Is patient agreeable to VNS: No    Freedom of choice provided:  No    List of [address] provided: NA    VNS chosen:  No    DME:  none    Home Oxygen: No    Nebulizer: No    CPAP/BIPAP: No    Supplier: N/A    Potential Assistance Needed: No    SNF needed: No    Freedom of choice and list provided: NA    Pharmacy:  [facility]       Is patient currently receiving oral anticoagulation therapy? No    Is the Patient an [facility] with Readmission Risk Score greater than 14%? No  If yes, pt needs a follow up appointment made within 7 days. Family Members/Caregivers that pt would like involved in their care:    Yes    If yes, list name here:  Leonie    Transportation Provider:  Family             Discharge Plan:  8/26/22 888 Hailee Fine is currently staying at the [facility] on [facility] DME non VNS none Plan is to discharge to home with no needs. Need to contact [facility] before discharge. //tv                 Electronically signed by:  Regine Hoffman RN on 8/26/2022 at 2:18 PM

## 2022-08-26 NOTE — ED NOTES
Report given to Doris Tilley RN from U. Report method by phone   The following was reviewed with receiving RN:   Current vital signs:  /60   Pulse 53   Temp 97.5 °F (36.4 °C) (Oral)   Resp 16   Wt 196 lb (88.9 kg)   SpO2 95%   BMI 27.34 kg/m²                MEWS Score: 1     Any medication or safety alerts were reviewed. Any pending diagnostics and notifications were also reviewed, as well as any safety concerns or issues, abnormal labs, abnormal imaging, and abnormal assessment findings. Questions were answered.               Reji Santana RN  08/26/22 5544

## 2022-08-26 NOTE — ED TRIAGE NOTES
Pt was found outside [place] stumbling, bystander concerned for pulseloss. Pt given CPR when EMS arrived, pt had a pulse but was agonal breathing. Pt given 4 IN narcan, 2 IC 18 G R AC placed via EMS. Pt is very diaphoretic. Pt took fentanyl unknown quanity, snorted.

## 2022-08-26 NOTE — ED NOTES
Respiratory here to instruct patient on incentive spirometry and patient reached 1900 on the incentive spirometry.      Aisha Chen, RN  08/26/22 [address] KEARNEY RN  08/26/22 1328

## 2022-08-26 NOTE — CARE COORDINATION
Writer spoke with Dr Sandy Glass, he will take over patient care and update Dr Sammy Palacios in the morning.

## 2022-08-27 ENCOUNTER — APPOINTMENT (OUTPATIENT)
Dept: GENERAL RADIOLOGY | Age: 37
DRG: 135 | End: 2022-08-27
Payer: COMMERCIAL

## 2022-08-27 VITALS
HEART RATE: 70 BPM | OXYGEN SATURATION: 99 % | HEIGHT: 71 IN | WEIGHT: 204.81 LBS | BODY MASS INDEX: 28.67 KG/M2 | SYSTOLIC BLOOD PRESSURE: 125 MMHG | DIASTOLIC BLOOD PRESSURE: 68 MMHG | TEMPERATURE: 98.4 F | RESPIRATION RATE: 18 BRPM

## 2022-08-27 PROBLEM — T40.411A ACCIDENTAL FENTANYL OVERDOSE (HCC): Status: ACTIVE | Noted: 2022-08-27

## 2022-08-27 PROBLEM — F14.10 COCAINE ABUSE (HCC): Status: ACTIVE | Noted: 2022-08-27

## 2022-08-27 PROBLEM — J45.909 ASTHMA: Status: ACTIVE | Noted: 2022-08-27

## 2022-08-27 PROBLEM — F19.10 POLYSUBSTANCE ABUSE (HCC): Status: ACTIVE | Noted: 2022-08-27

## 2022-08-27 LAB
ANION GAP SERPL CALCULATED.3IONS-SCNC: 5 MMOL/L (ref 9–17)
BUN BLDV-MCNC: 19 MG/DL (ref 6–20)
CALCIUM SERPL-MCNC: 8.7 MG/DL (ref 8.6–10.4)
CHLORIDE BLD-SCNC: 105 MMOL/L (ref 98–107)
CO2: 29 MMOL/L (ref 20–31)
CREAT SERPL-MCNC: 0.9 MG/DL (ref 0.7–1.2)
GFR AFRICAN AMERICAN: >60 ML/MIN
GFR NON-AFRICAN AMERICAN: >60 ML/MIN
GFR SERPL CREATININE-BSD FRML MDRD: ABNORMAL ML/MIN/{1.73_M2}
GLUCOSE BLD-MCNC: 126 MG/DL (ref 70–99)
HCT VFR BLD CALC: 42.3 % (ref 41–53)
HEMOGLOBIN: 14.2 G/DL (ref 13.5–17.5)
MCH RBC QN AUTO: 31.6 PG (ref 26–34)
MCHC RBC AUTO-ENTMCNC: 33.5 G/DL (ref 31–37)
MCV RBC AUTO: 94.2 FL (ref 80–100)
PDW BLD-RTO: 14.4 % (ref 11.5–14.9)
PLATELET # BLD: 217 K/UL (ref 150–450)
PMV BLD AUTO: 6.7 FL (ref 6–12)
POTASSIUM SERPL-SCNC: 4.7 MMOL/L (ref 3.7–5.3)
RBC # BLD: 4.49 M/UL (ref 4.5–5.9)
SODIUM BLD-SCNC: 139 MMOL/L (ref 135–144)
TROPONIN, HIGH SENSITIVITY: 18 NG/L (ref 0–22)
WBC # BLD: 8.2 K/UL (ref 3.5–11)

## 2022-08-27 PROCEDURE — 84484 ASSAY OF TROPONIN QUANT: CPT

## 2022-08-27 PROCEDURE — 94761 N-INVAS EAR/PLS OXIMETRY MLT: CPT

## 2022-08-27 PROCEDURE — 6360000002 HC RX W HCPCS: Performed by: STUDENT IN AN ORGANIZED HEALTH CARE EDUCATION/TRAINING PROGRAM

## 2022-08-27 PROCEDURE — 6370000000 HC RX 637 (ALT 250 FOR IP): Performed by: STUDENT IN AN ORGANIZED HEALTH CARE EDUCATION/TRAINING PROGRAM

## 2022-08-27 PROCEDURE — 71045 X-RAY EXAM CHEST 1 VIEW: CPT

## 2022-08-27 PROCEDURE — 85027 COMPLETE CBC AUTOMATED: CPT

## 2022-08-27 PROCEDURE — 97165 OT EVAL LOW COMPLEX 30 MIN: CPT

## 2022-08-27 PROCEDURE — 6370000000 HC RX 637 (ALT 250 FOR IP): Performed by: INTERNAL MEDICINE

## 2022-08-27 PROCEDURE — 2580000003 HC RX 258: Performed by: STUDENT IN AN ORGANIZED HEALTH CARE EDUCATION/TRAINING PROGRAM

## 2022-08-27 PROCEDURE — 99239 HOSP IP/OBS DSCHRG MGMT >30: CPT | Performed by: INTERNAL MEDICINE

## 2022-08-27 PROCEDURE — 6370000000 HC RX 637 (ALT 250 FOR IP): Performed by: SURGERY

## 2022-08-27 PROCEDURE — 97161 PT EVAL LOW COMPLEX 20 MIN: CPT

## 2022-08-27 PROCEDURE — 80048 BASIC METABOLIC PNL TOTAL CA: CPT

## 2022-08-27 PROCEDURE — 36415 COLL VENOUS BLD VENIPUNCTURE: CPT

## 2022-08-27 RX ORDER — BUDESONIDE AND FORMOTEROL FUMARATE DIHYDRATE 80; 4.5 UG/1; UG/1
2 AEROSOL RESPIRATORY (INHALATION) 2 TIMES DAILY
Status: DISCONTINUED | OUTPATIENT
Start: 2022-08-27 | End: 2022-08-27 | Stop reason: HOSPADM

## 2022-08-27 RX ORDER — FLUTICASONE PROPIONATE 50 MCG
1 SPRAY, SUSPENSION (ML) NASAL 2 TIMES DAILY
Status: DISCONTINUED | OUTPATIENT
Start: 2022-08-27 | End: 2022-08-27 | Stop reason: HOSPADM

## 2022-08-27 RX ORDER — ALPRAZOLAM 0.5 MG/1
1 TABLET ORAL 3 TIMES DAILY PRN
Status: DISCONTINUED | OUTPATIENT
Start: 2022-08-27 | End: 2022-08-27 | Stop reason: HOSPADM

## 2022-08-27 RX ORDER — HYDROCODONE BITARTRATE AND ACETAMINOPHEN 5; 325 MG/1; MG/1
1 TABLET ORAL EVERY 4 HOURS PRN
Qty: 18 TABLET | Refills: 0 | Status: SHIPPED | OUTPATIENT
Start: 2022-08-27 | End: 2022-08-30

## 2022-08-27 RX ORDER — ONDANSETRON 4 MG/1
4 TABLET, ORALLY DISINTEGRATING ORAL EVERY 8 HOURS PRN
Qty: 30 TABLET | Refills: 0 | Status: SHIPPED | OUTPATIENT
Start: 2022-08-27

## 2022-08-27 RX ORDER — LIDOCAINE 4 G/G
1 PATCH TOPICAL DAILY
Qty: 5 PATCH | Refills: 1 | Status: SHIPPED | OUTPATIENT
Start: 2022-08-28

## 2022-08-27 RX ADMIN — FLUTICASONE PROPIONATE 1 SPRAY: 50 SPRAY, METERED NASAL at 11:04

## 2022-08-27 RX ADMIN — LURASIDONE HYDROCHLORIDE 40 MG: 40 TABLET, FILM COATED ORAL at 11:04

## 2022-08-27 RX ADMIN — SODIUM CHLORIDE, PRESERVATIVE FREE 10 ML: 5 INJECTION INTRAVENOUS at 11:00

## 2022-08-27 RX ADMIN — ACETAMINOPHEN 1000 MG: 500 TABLET ORAL at 06:09

## 2022-08-27 RX ADMIN — ALPRAZOLAM 1 MG: 0.5 TABLET ORAL at 11:03

## 2022-08-27 RX ADMIN — KETOROLAC TROMETHAMINE 15 MG: 30 INJECTION, SOLUTION INTRAMUSCULAR; INTRAVENOUS at 02:04

## 2022-08-27 RX ADMIN — CYCLOBENZAPRINE 5 MG: 10 TABLET, FILM COATED ORAL at 11:03

## 2022-08-27 RX ADMIN — ENOXAPARIN SODIUM 40 MG: 100 INJECTION SUBCUTANEOUS at 11:06

## 2022-08-27 RX ADMIN — KETOROLAC TROMETHAMINE 15 MG: 30 INJECTION, SOLUTION INTRAMUSCULAR; INTRAVENOUS at 11:03

## 2022-08-27 ASSESSMENT — ENCOUNTER SYMPTOMS
DIARRHEA: 0
WHEEZING: 0
VOMITING: 0
SORE THROAT: 0
ABDOMINAL PAIN: 0
CONSTIPATION: 0
NAUSEA: 0
SHORTNESS OF BREATH: 0
COUGH: 0
BACK PAIN: 0

## 2022-08-27 ASSESSMENT — PAIN DESCRIPTION - DESCRIPTORS
DESCRIPTORS: SHARP;STABBING
DESCRIPTORS: ACHING

## 2022-08-27 ASSESSMENT — PAIN DESCRIPTION - ORIENTATION
ORIENTATION: MID
ORIENTATION: LEFT

## 2022-08-27 ASSESSMENT — PAIN - FUNCTIONAL ASSESSMENT
PAIN_FUNCTIONAL_ASSESSMENT: PREVENTS OR INTERFERES SOME ACTIVE ACTIVITIES AND ADLS
PAIN_FUNCTIONAL_ASSESSMENT: PREVENTS OR INTERFERES SOME ACTIVE ACTIVITIES AND ADLS

## 2022-08-27 ASSESSMENT — PAIN DESCRIPTION - LOCATION
LOCATION: CHEST
LOCATION: ABDOMEN

## 2022-08-27 ASSESSMENT — PAIN SCALES - GENERAL
PAINLEVEL_OUTOF10: 10
PAINLEVEL_OUTOF10: 10
PAINLEVEL_OUTOF10: 8

## 2022-08-27 NOTE — PROGRESS NOTES
Patient was seen and examined. Awake alert in no acute distress. Complaining of some chest wall pain secondary to rib fractures. Afebrile vital signs are stable. Lungs decreased air entry at bases. Abdomen is soft nondistended nontender. Extremity nontender. Blood work reviewed. BMP normal.  WBC count normal.  Hemoglobin 14. Platelet count adequate. Chest x-ray no acute cardiopulmonary disease. Pulmonary consult. Pain control. Wean and DC oxygen per pulmonary. Medical management. Discharge to home when okay with consultants. Increase activity. Pulmonary toilet. Ambulate. Diet as tolerated.

## 2022-08-27 NOTE — PROGRESS NOTES
Within Functional Limits  Hearing  Hearing: Within functional limits    Cognition   Orientation  Orientation Level: Oriented X4  Cognition  Overall Cognitive Status: WNL     Objective   Heart Rate: 70  Heart Rate Source: Monitor  BP: 125/68  BP Location: Left upper arm  BP Method: Automatic  Patient Position: Semi fowlers  MAP (Calculated): 87  Resp: 18  SpO2: 99 %  O2 Device: None (Room air)    AROM RLE (degrees)  RLE AROM: WFL  AROM LLE (degrees)  LLE AROM : WFL  AROM RUE (degrees)  RUE AROM : WFL  AROM LUE (degrees)  LUE AROM : WFL       Bed mobility  Rolling to Left: Independent  Rolling to Right: Independent  Supine to Sit: Independent  Sit to Supine: Independent  Scooting: Independent    Transfers  Sit to Stand: Independent  Stand to sit:  Independent  Lateral Transfers: Independent    Ambulation  Surface: level tile  Device: No Device  Assistance: Supervision  Quality of Gait: minimal path deviation and slight unsteady with direction change Patient able to self correct LOB  Distance: 42ft x2  More Ambulation?: No  Stairs/Curb  Stairs?: No     Balance  Sitting - Static: Good  Sitting - Dynamic: Good  Standing - Static: Fair;+  Standing - Dynamic: Fair;+  Comments: EC LOB      OutComes Score    AM-PAC Score  AM-PAC Inpatient Mobility Raw Score : 22 (08/27/22 1428)  AM-PAC Inpatient T-Scale Score : 53.28 (08/27/22 1428)  Mobility Inpatient CMS 0-100% Score: 20.91 (08/27/22 1428)  Mobility Inpatient CMS G-Code Modifier : CJ (08/27/22 1428)       Goals  Short Term Goals  Time Frame for Short term goals: 3 visits  Short term goal 1: Patient to dedmonstrate IND with ambulation 100ft  Short term goal 2: Patient to demostrate the ability to comp;ete 5 steps IND      Therapy Time   Individual Concurrent Group Co-treatment   Time In [facility] 159         Time Out 1442         Minutes [address], [address], [place]

## 2022-08-27 NOTE — H&P
General Surgery Consult      Pt Name: Norah Nugent  MRN: [id number]  YOB: 1985  Date of evaluation: 8/26/2022  Primary Care Physician: Lisbet Reinoso   Patient evaluated at the request of  Dr. Wilbert Mcclure  Reason for evaluation: left-sided rib fractures    SUBJECTIVE:   History of Chief Complaint:    Norah Nugent is a 44 y.o. male who presents with fentanyl overdose. patient was found down. CPR was performed. Patient had evidence of broken/fractured left second through fourth ribs. I was asked to evaluate the patient for traumatic reasons. Rest of the imaging studies and blood work from the emergency department reviewed. Currently no acute shortness of breath. Tolerating diet. Abdominal pain. No nausea vomiting. No headache. No neck pain. Able to move all extremities well. Symptom onset has been acute for a time period of few hour(s). Severity is described as moderate. Course of his symptoms over time is acute. Past Medical History   has a past medical history of Asthma. Past Surgical History   has no past surgical history on file. Medications  Prior to Admission medications    Medication Sig Start Date End Date Taking?  Authorizing Provider   orphenadrine (NORFLEX) 100 MG extended release tablet Take 1 tablet by mouth 2 times daily 9/13/18   Sean Beavers MD   ibuprofen (ADVIL;MOTRIN) 800 MG tablet Take 1 tablet by mouth every 8 hours as needed for Pain  Patient not taking: No sig reported 6/23/16   Arin Sorenson PA-C   lurasidone (LATUDA) 40 MG TABS tablet Take 40 mg by mouth daily  Patient not taking: No sig reported    Historical Provider, MD   fluticasone (FLONASE) 50 MCG/ACT nasal spray 1 spray by Nasal route 2 times daily  Patient not taking: No sig reported 10/7/15   Naheed Ruiz MD   pseudoephedrine (SUDAFED) 60 MG tablet Take 1 tablet by mouth every 6 hours as needed for Congestion  Patient not taking: No sig reported 10/7/15   Naheed Ruiz MD   albuterol (PROVENTIL) (2.5 rales or rhonchi. No accessory muscle use. Scars None. some tenderness over the area of rib fractures on the left anterior chest.  CARDIOVASCULAR: Heart regular rate and rhythm Normal S1 and S2. . Carotid and femoral pulses 2+/4 and equal bilaterally  ABDOMEN: soft abdomen nondistended nontender  RECTAL: deferred, not clinically indicated  NEUROLOGIC: There are no focalizing motor or sensory deficits. CN II-XII are grossly intact.   EXTREMITIES: no cyanosis, no clubbing, and no edema    LABS:   CBC with Differential:    Lab Results   Component Value Date/Time    WBC 12.5 08/25/2022 10:51 PM    RBC 5.01 08/25/2022 10:51 PM    HGB 15.6 08/25/2022 10:51 PM    HCT 46.8 08/25/2022 10:51 PM     08/25/2022 10:51 PM    MCV 93.5 08/25/2022 10:51 PM    MCH 31.1 08/25/2022 10:51 PM    MCHC 33.3 08/25/2022 10:51 PM    RDW 14.4 08/25/2022 10:51 PM    METASPCT 2 08/25/2022 10:51 PM    LYMPHOPCT 30 08/25/2022 10:51 PM    MONOPCT 5 08/25/2022 10:51 PM    MYELOPCT 1 08/25/2022 10:51 PM    BASOPCT 1 08/25/2022 10:51 PM    MONOSABS 0.63 08/25/2022 10:51 PM    LYMPHSABS 3.75 08/25/2022 10:51 PM    EOSABS 0.25 08/25/2022 10:51 PM    BASOSABS 0.13 08/25/2022 10:51 PM    DIFFTYPE NOT REPORTED 06/13/2018 02:05 AM     BMP:    Lab Results   Component Value Date/Time     08/25/2022 10:51 PM    K 3.8 08/25/2022 10:51 PM     08/25/2022 10:51 PM    CO2 12 08/25/2022 10:51 PM    BUN 13 08/25/2022 10:51 PM    LABALBU 4.4 08/25/2022 10:51 PM    CREATININE 1.23 08/25/2022 10:51 PM    CALCIUM 9.1 08/25/2022 10:51 PM    GFRAA >60 08/25/2022 10:51 PM    LABGLOM >60 08/25/2022 10:51 PM    GLUCOSE 158 08/25/2022 10:51 PM     Hepatic Function Panel:    Lab Results   Component Value Date/Time    ALKPHOS 128 08/25/2022 10:51 PM    ALT 21 08/25/2022 10:51 PM    AST 27 08/25/2022 10:51 PM    PROT 7.0 08/25/2022 10:51 PM    BILITOT 0.68 08/25/2022 10:51 PM    LABALBU 4.4 08/25/2022 10:51 PM     Calcium:    Lab Results   Component Value PROVIDED HISTORY: fall Decision Support Exception - unselect if not a suspected or confirmed emergency medical condition->Emergency Medical Condition (MA) Reason for Exam: fall Additional signs and symptoms: Overdose, found on ground; c/o headache and neck soreness. ; ORDERING SYSTEM PROVIDED HISTORY: fall TECHNOLOGIST PROVIDED HISTORY: fall Decision Support Exception - unselect if not a suspected or confirmed emergency medical condition->Emergency Medical Condition (MA) Reason for Exam: Fall Additional signs and symptoms: Overdose, found on ground; c/o headache and neck soreness.; ORDERING SYSTEM PROVIDED HISTORY: fall TECHNOLOGIST PROVIDED HISTORY: fall Decision Support Exception - unselect if not a suspected or confirmed emergency medical condition->Emergency Medical Condition (MA) Reason for Exam: Fall Additional signs and symptoms: Overdose on fentanyl, found pulseless with agonal breathing. CPR given on site; patient c/o mid sternal chest pain and nausea. FINDINGS: Findings on CT scan of brain, without  contrast: BRAIN/VENTRICLES: There is no acute intracranial hemorrhage, mass effect or midline shift. No abnormal extra-axial fluid collection. The gray-white differentiation is maintained without evidence of an acute infarct. There is no evidence of hydrocephalus. ORBITS: The visualized portion of the orbits demonstrate no acute abnormality. SINUSES: Mild to moderate mucosal thickening seen in the maxillary sinuses, and ethmoidal sinuses, without fluid level. Remaining paranasal sinuses are clear. SOFT TISSUES/SKULL:  No acute abnormality of the visualized skull or soft tissues. Findings on CT scan of cervical spine: Cervical spine is straightened on the digital images. On the coronal images, there is mild bending of the cervical spine with mild convexity to the right. Cervical vertebral bodies are of normal height.  There is no evidence of fracture or compression or osseous malalignment in the cervical spine.  Intact odontoid process, the atlantal dental joint, the craniovertebral junctions and bilateral cervical facet joints. No demonstrable abnormal at C1-C2, C2-C3, C3-C4, C4-C5 and C5-C6 levels. At C6-C7 level, evidence of moderate degenerative disc disease with mild to moderate posterior disc bulge and mild to moderate posterior osteophytes from endplates. Mild central stenosis. Mild left neural foraminal stenosis. No right neural foraminal stenosis. At C7-T1 and T1-T2 level, no demonstrable abnormality. No acute process in the visualized apices of the lungs. In the soft tissues of neck, there is no diagnostic findings. CT scan of chest, with contrast-: Evidence of fractures in the anterior ends of the left 2nd, 3rd and 4th ribs. No evidence of fracture in the right ribs, thoracic spine or sternum. There is no acute process in the lungs. No pleural effusion or pneumothorax. Mild emphysematous changes in lungs, right more than left. In the posterolateral portion of right pulmonary lower lobe, there is a noncalcified pulmonary nodule 5.9 mm in diameter. Normal thoracic aorta. No evidence of pulmonary embolism in the opacified central pulmonary arteries and their central branches. No evidence of mediastinal mass or hemorrhage. No pericardial effusion. Normal cardiac size. CT scan of abdomen and pelvis with contrast: No evidence of intraperitoneal or retroperitoneal hemorrhage in abdomen and pelvis. No evidence of visceral injury in the abdomen or in the pelvis. In the gallbladder, there is a stone measuring 1.8 cm in diameter. Normal liver, spleen, pancreas, bilateral adrenal glands and bilateral kidneys. Normal abdominal aorta and branches. Normal pelvic arteries. No evidence of abdominal or pelvic pathologic lymphadenopathy. No diagnostic finding in the stomach. Minimal gas and mildly increased fluid contents in the distal small bowel. Normal appendix posteroinferior to cecum.   Evidence of moderate amount of stool and small amount of gas are scattered in the proximal and mid colon. Small amount of stool and gas in the distal colon. No evidence of diverticulosis coli or colitis. No evidence of urinary calculus or obstructive uropathy. No evidence of pelvic inflammation or abnormal fluid collection. No evidence of acute fracture in lumbar spine, pelvic bones and hip joints. No soft tissue hematoma or focal abnormality in the abdominal wall, pelvic wall or soft tissues of back. No evidence of intracranial hemorrhage or any other definable acute intracranial abnormality. No evidence of acute fracture or malalignment in the cervical spine. Fractures at the anterior ends of the left 2nd, 3rd, and 4th ribs. No pneumothorax or pleural effusion. Mild pulmonary emphysema without any acute process in the lungs. Normal thoracic aorta and mediastinal structures. 1.8 cm size calculus in the gallbladder. No evidence of internal hemorrhage or visceral injury in abdomen or pelvis. Normal abdominal aorta. Mild increased fluid in the distal small bowel. Normal appendix visualized. No evidence of fracture in the visualized bony structure of the thoracic spine, lumbar spine and pelvis. CT CSpine W/O Contrast    Result Date: 8/26/2022  EXAMINATION: CT OF THE HEAD WITHOUT CONTRAST; CT OF THE CERVICAL SPINE WITHOUT CONTRAST; CT OF THE CHEST, ABDOMEN, AND PELVIS WITH CONTRAST  8/25/2022 10:31 pm TECHNIQUE: CT of the head was performed without the administration of intravenous contrast. Automated exposure control, iterative reconstruction, and/or weight based adjustment of the mA/kV was utilized to reduce the radiation dose to as low as reasonably achievable.; CT of the cervical spine was performed without the administration of intravenous contrast. Multiplanar reformatted images are provided for review.  Automated exposure control, iterative reconstruction, and/or weight based adjustment of the mA/kV was utilized to reduce the radiation dose to as low as reasonably achievable.; CT of the chest, abdomen and pelvis was performed with the administration of intravenous contrast. Multiplanar reformatted images are provided for review. Automated exposure control, iterative reconstruction, and/or weight based adjustment of the mA/kV was utilized to reduce the radiation dose to as low as reasonably achievable. COMPARISON: CT scan of head without contrast on 10/07/2015. CT scan of the neck on 10/18/2014. HISTORY: ORDERING SYSTEM PROVIDED HISTORY: fall TECHNOLOGIST PROVIDED HISTORY: fall Decision Support Exception - unselect if not a suspected or confirmed emergency medical condition->Emergency Medical Condition (MA) Reason for Exam: fall Additional signs and symptoms: Overdose, found on ground; c/o headache and neck soreness. ; ORDERING SYSTEM PROVIDED HISTORY: fall TECHNOLOGIST PROVIDED HISTORY: fall Decision Support Exception - unselect if not a suspected or confirmed emergency medical condition->Emergency Medical Condition (MA) Reason for Exam: Fall Additional signs and symptoms: Overdose, found on ground; c/o headache and neck soreness.; ORDERING SYSTEM PROVIDED HISTORY: fall TECHNOLOGIST PROVIDED HISTORY: fall Decision Support Exception - unselect if not a suspected or confirmed emergency medical condition->Emergency Medical Condition (MA) Reason for Exam: Fall Additional signs and symptoms: Overdose on fentanyl, found pulseless with agonal breathing. CPR given on site; patient c/o mid sternal chest pain and nausea. FINDINGS: Findings on CT scan of brain, without  contrast: BRAIN/VENTRICLES: There is no acute intracranial hemorrhage, mass effect or midline shift. No abnormal extra-axial fluid collection. The gray-white differentiation is maintained without evidence of an acute infarct. There is no evidence of hydrocephalus. ORBITS: The visualized portion of the orbits demonstrate no acute abnormality.  SINUSES: Mild to moderate mucosal thickening seen in the maxillary sinuses, and ethmoidal sinuses, without fluid level. Remaining paranasal sinuses are clear. SOFT TISSUES/SKULL:  No acute abnormality of the visualized skull or soft tissues. Findings on CT scan of cervical spine: Cervical spine is straightened on the digital images. On the coronal images, there is mild bending of the cervical spine with mild convexity to the right. Cervical vertebral bodies are of normal height. There is no evidence of fracture or compression or osseous malalignment in the cervical spine. Intact odontoid process, the atlantal dental joint, the craniovertebral junctions and bilateral cervical facet joints. No demonstrable abnormal at C1-C2, C2-C3, C3-C4, C4-C5 and C5-C6 levels. At C6-C7 level, evidence of moderate degenerative disc disease with mild to moderate posterior disc bulge and mild to moderate posterior osteophytes from endplates. Mild central stenosis. Mild left neural foraminal stenosis. No right neural foraminal stenosis. At C7-T1 and T1-T2 level, no demonstrable abnormality. No acute process in the visualized apices of the lungs. In the soft tissues of neck, there is no diagnostic findings. CT scan of chest, with contrast-: Evidence of fractures in the anterior ends of the left 2nd, 3rd and 4th ribs. No evidence of fracture in the right ribs, thoracic spine or sternum. There is no acute process in the lungs. No pleural effusion or pneumothorax. Mild emphysematous changes in lungs, right more than left. In the posterolateral portion of right pulmonary lower lobe, there is a noncalcified pulmonary nodule 5.9 mm in diameter. Normal thoracic aorta. No evidence of pulmonary embolism in the opacified central pulmonary arteries and their central branches. No evidence of mediastinal mass or hemorrhage. No pericardial effusion. Normal cardiac size.  CT scan of abdomen and pelvis with contrast: No evidence of intraperitoneal or retroperitoneal hemorrhage in abdomen and pelvis. No evidence of visceral injury in the abdomen or in the pelvis. In the gallbladder, there is a stone measuring 1.8 cm in diameter. Normal liver, spleen, pancreas, bilateral adrenal glands and bilateral kidneys. Normal abdominal aorta and branches. Normal pelvic arteries. No evidence of abdominal or pelvic pathologic lymphadenopathy. No diagnostic finding in the stomach. Minimal gas and mildly increased fluid contents in the distal small bowel. Normal appendix posteroinferior to cecum. Evidence of moderate amount of stool and small amount of gas are scattered in the proximal and mid colon. Small amount of stool and gas in the distal colon. No evidence of diverticulosis coli or colitis. No evidence of urinary calculus or obstructive uropathy. No evidence of pelvic inflammation or abnormal fluid collection. No evidence of acute fracture in lumbar spine, pelvic bones and hip joints. No soft tissue hematoma or focal abnormality in the abdominal wall, pelvic wall or soft tissues of back. No evidence of intracranial hemorrhage or any other definable acute intracranial abnormality. No evidence of acute fracture or malalignment in the cervical spine. Fractures at the anterior ends of the left 2nd, 3rd, and 4th ribs. No pneumothorax or pleural effusion. Mild pulmonary emphysema without any acute process in the lungs. Normal thoracic aorta and mediastinal structures. 1.8 cm size calculus in the gallbladder. No evidence of internal hemorrhage or visceral injury in abdomen or pelvis. Normal abdominal aorta. Mild increased fluid in the distal small bowel. Normal appendix visualized. No evidence of fracture in the visualized bony structure of the thoracic spine, lumbar spine and pelvis.      XR CHEST PORTABLE    Result Date: 8/25/2022  EXAMINATION: ONE XRAY VIEW OF THE CHEST 8/25/2022 10:59 pm COMPARISON: 10/09/2021 HISTORY: ORDERING SYSTEM PROVIDED HISTORY: overdose TECHNOLOGIST PROVIDED HISTORY: overdose Reason for Exam: Overdose, PT will not hold still or allow me to place board in proper position without moving or sliding off the board. Multiple attempts made with best image sent per pt condition and cooperation at this time. FINDINGS: Evaluation limited due to nonvisualization of the left costophrenic angle and slight motion artifact, although are best possible given patient condition. No focal consolidations. No pleural effusion or pneumothorax visualized. Heart size is within normal limits. No acute process. CT CHEST ABDOMEN PELVIS W CONTRAST    Result Date: 8/26/2022  EXAMINATION: CT OF THE HEAD WITHOUT CONTRAST; CT OF THE CERVICAL SPINE WITHOUT CONTRAST; CT OF THE CHEST, ABDOMEN, AND PELVIS WITH CONTRAST  8/25/2022 10:31 pm TECHNIQUE: CT of the head was performed without the administration of intravenous contrast. Automated exposure control, iterative reconstruction, and/or weight based adjustment of the mA/kV was utilized to reduce the radiation dose to as low as reasonably achievable.; CT of the cervical spine was performed without the administration of intravenous contrast. Multiplanar reformatted images are provided for review. Automated exposure control, iterative reconstruction, and/or weight based adjustment of the mA/kV was utilized to reduce the radiation dose to as low as reasonably achievable.; CT of the chest, abdomen and pelvis was performed with the administration of intravenous contrast. Multiplanar reformatted images are provided for review. Automated exposure control, iterative reconstruction, and/or weight based adjustment of the mA/kV was utilized to reduce the radiation dose to as low as reasonably achievable. COMPARISON: CT scan of head without contrast on 10/07/2015. CT scan of the neck on 10/18/2014.  HISTORY: ORDERING SYSTEM PROVIDED HISTORY: fall TECHNOLOGIST PROVIDED HISTORY: fall Decision Support Exception - unselect if not a suspected or confirmed emergency medical condition->Emergency Medical Condition (MA) Reason for Exam: fall Additional signs and symptoms: Overdose, found on ground; c/o headache and neck soreness. ; ORDERING SYSTEM PROVIDED HISTORY: fall TECHNOLOGIST PROVIDED HISTORY: fall Decision Support Exception - unselect if not a suspected or confirmed emergency medical condition->Emergency Medical Condition (MA) Reason for Exam: Fall Additional signs and symptoms: Overdose, found on ground; c/o headache and neck soreness.; ORDERING SYSTEM PROVIDED HISTORY: fall TECHNOLOGIST PROVIDED HISTORY: fall Decision Support Exception - unselect if not a suspected or confirmed emergency medical condition->Emergency Medical Condition (MA) Reason for Exam: Fall Additional signs and symptoms: Overdose on fentanyl, found pulseless with agonal breathing. CPR given on site; patient c/o mid sternal chest pain and nausea. FINDINGS: Findings on CT scan of brain, without  contrast: BRAIN/VENTRICLES: There is no acute intracranial hemorrhage, mass effect or midline shift. No abnormal extra-axial fluid collection. The gray-white differentiation is maintained without evidence of an acute infarct. There is no evidence of hydrocephalus. ORBITS: The visualized portion of the orbits demonstrate no acute abnormality. SINUSES: Mild to moderate mucosal thickening seen in the maxillary sinuses, and ethmoidal sinuses, without fluid level. Remaining paranasal sinuses are clear. SOFT TISSUES/SKULL:  No acute abnormality of the visualized skull or soft tissues. Findings on CT scan of cervical spine: Cervical spine is straightened on the digital images. On the coronal images, there is mild bending of the cervical spine with mild convexity to the right. Cervical vertebral bodies are of normal height. There is no evidence of fracture or compression or osseous malalignment in the cervical spine.   Intact odontoid process, the atlantal dental joint, the craniovertebral junctions and bilateral cervical facet joints. No demonstrable abnormal at C1-C2, C2-C3, C3-C4, C4-C5 and C5-C6 levels. At C6-C7 level, evidence of moderate degenerative disc disease with mild to moderate posterior disc bulge and mild to moderate posterior osteophytes from endplates. Mild central stenosis. Mild left neural foraminal stenosis. No right neural foraminal stenosis. At C7-T1 and T1-T2 level, no demonstrable abnormality. No acute process in the visualized apices of the lungs. In the soft tissues of neck, there is no diagnostic findings. CT scan of chest, with contrast-: Evidence of fractures in the anterior ends of the left 2nd, 3rd and 4th ribs. No evidence of fracture in the right ribs, thoracic spine or sternum. There is no acute process in the lungs. No pleural effusion or pneumothorax. Mild emphysematous changes in lungs, right more than left. In the posterolateral portion of right pulmonary lower lobe, there is a noncalcified pulmonary nodule 5.9 mm in diameter. Normal thoracic aorta. No evidence of pulmonary embolism in the opacified central pulmonary arteries and their central branches. No evidence of mediastinal mass or hemorrhage. No pericardial effusion. Normal cardiac size. CT scan of abdomen and pelvis with contrast: No evidence of intraperitoneal or retroperitoneal hemorrhage in abdomen and pelvis. No evidence of visceral injury in the abdomen or in the pelvis. In the gallbladder, there is a stone measuring 1.8 cm in diameter. Normal liver, spleen, pancreas, bilateral adrenal glands and bilateral kidneys. Normal abdominal aorta and branches. Normal pelvic arteries. No evidence of abdominal or pelvic pathologic lymphadenopathy. No diagnostic finding in the stomach. Minimal gas and mildly increased fluid contents in the distal small bowel. Normal appendix posteroinferior to cecum. Evidence of moderate amount of stool and small amount of gas are scattered in the proximal and mid colon.  Small amount of stool and gas in the distal colon. No evidence of diverticulosis coli or colitis. No evidence of urinary calculus or obstructive uropathy. No evidence of pelvic inflammation or abnormal fluid collection. No evidence of acute fracture in lumbar spine, pelvic bones and hip joints. No soft tissue hematoma or focal abnormality in the abdominal wall, pelvic wall or soft tissues of back. No evidence of intracranial hemorrhage or any other definable acute intracranial abnormality. No evidence of acute fracture or malalignment in the cervical spine. Fractures at the anterior ends of the left 2nd, 3rd, and 4th ribs. No pneumothorax or pleural effusion. Mild pulmonary emphysema without any acute process in the lungs. Normal thoracic aorta and mediastinal structures. 1.8 cm size calculus in the gallbladder. No evidence of internal hemorrhage or visceral injury in abdomen or pelvis. Normal abdominal aorta. Mild increased fluid in the distal small bowel. Normal appendix visualized. No evidence of fracture in the visualized bony structure of the thoracic spine, lumbar spine and pelvis. IMPRESSION:   Fentanyl overdose status post CPR with evidence of left second third and fourth anterior fractures. No evidence of pneumothorax. Blood work reviewed. Imaging studies reviewed. does not have any pertinent problems on file. PLAN:   Continue conservative management. Pain control. Pulmonary toilet. Lidoderm patch. Deep breathing and coughing. Internal medicine consult. Activity as tolerated. Likely discharge to home tomorrow. Thank you for this interesting consult and for allowing us to participate in the care of this patient. If you have any questions please don't hesitate to call.           Electronically signed by Wilhemina Olszewski, MD  on 8/26/2022 at 8:54 PM

## 2022-08-27 NOTE — DISCHARGE SUMMARY
[place] [id number] Internal Medicine    Discharge Summary     Patient ID: Jill Pereyra  :  1985   MRN: [id number]     ACCOUNT:  [de-identified]   Patient's PCP: Rafael Esparza  Admit Date: 2022   Discharge Date: 2022    Length of Stay: 1  Code Status:  Full Code  Admitting Physician: Fatuma Cervantes MD  Discharge Physician: Jean-Claude Holcomb MD     Active Discharge Diagnoses:     Primary Problem  Rib injury      [place] Problems    Diagnosis Date Noted    Polysubstance abuse [facility]) [F19.10] 2022     Priority: Medium    Accidental fentanyl overdose ([facility] [address] [employer] 2022     Priority: Medium    Cocaine abuse ([facility] [address] [F14.10] 2022     Priority: Medium    Rib injury [S29. 9XXA] 2022     Priority: Medium    Trauma [facility] 2022     Priority: Medium       Admission Condition:  fair     Discharged Condition: fair    Hospital Stay:     Hospital Course:  Jill Pereyra is a 44 y.o. male who was admitted for the management of Rib injury , presented to ER with Drug Overdose  Jill Pereyra is a 44 y.o. Non- / non  male who presents with Drug Overdose and is admitted to the hospital for the management of Rib injury. Past medical history includes asthma. Patient was found down with fentanyl overdose. CPR performed at scene and Narcan administered. In ED, tox screen positive for amphetamine, cocaine and fentanyl. CT chest with evidence of broken fractured left 2nd -4th ribs. No other injuries noted on CT head, chest or abdomen. Trauma surgery also following patient. Patient currently being weaned from oxygen. Denies shortness of breath and tolerating weaning well. Receiving IV Toradol for pain. Reports left rib pain increased with cough or movement. Denies fever, chills, chest pain, cough, abdominal pain, nausea, vomiting, diarrhea, and urinary symptoms. Symptoms are aggravated by increased movement or cough. When discussing polysubstance abuse and overdose patient is limited in reply. Symptoms are alleviated by pain medication and rest. Symptoms are reported as acute, intermittent and moderate in severity. Admits that he is not taking any home medications except for albuterol inhaler    Pt seen by trauma  And pulm  [place] to [place]  Police to be informed about dischage planning      Significant therapeutic interventions:     Significant Diagnostic Studies:   Labs / Micro:        ,     Radiology:    CT Head W/O Contrast    Result Date: 8/26/2022  EXAMINATION: CT OF THE HEAD WITHOUT CONTRAST; CT OF THE CERVICAL SPINE WITHOUT CONTRAST; CT OF THE CHEST, ABDOMEN, AND PELVIS WITH CONTRAST  8/25/2022 10:31 pm TECHNIQUE: CT of the head was performed without the administration of intravenous contrast. Automated exposure control, iterative reconstruction, and/or weight based adjustment of the mA/kV was utilized to reduce the radiation dose to as low as reasonably achievable.; CT of the cervical spine was performed without the administration of intravenous contrast. Multiplanar reformatted images are provided for review. Automated exposure control, iterative reconstruction, and/or weight based adjustment of the mA/kV was utilized to reduce the radiation dose to as low as reasonably achievable.; CT of the chest, abdomen and pelvis was performed with the administration of intravenous contrast. Multiplanar reformatted images are provided for review. Automated exposure control, iterative reconstruction, and/or weight based adjustment of the mA/kV was utilized to reduce the radiation dose to as low as reasonably achievable. COMPARISON: CT scan of head without contrast on 10/07/2015. CT scan of the neck on 10/18/2014.  HISTORY: ORDERING SYSTEM PROVIDED HISTORY: fall TECHNOLOGIST PROVIDED HISTORY: fall Decision Support Exception - unselect if not a suspected or confirmed emergency medical condition->Emergency Medical Condition (MA) Reason for Exam: fall Additional signs and symptoms: Overdose, found on ground; c/o headache and neck soreness. ; ORDERING SYSTEM PROVIDED HISTORY: fall TECHNOLOGIST PROVIDED HISTORY: fall Decision Support Exception - unselect if not a suspected or confirmed emergency medical condition->Emergency Medical Condition (MA) Reason for Exam: Fall Additional signs and symptoms: Overdose, found on ground; c/o headache and neck soreness.; ORDERING SYSTEM PROVIDED HISTORY: fall TECHNOLOGIST PROVIDED HISTORY: fall Decision Support Exception - unselect if not a suspected or confirmed emergency medical condition->Emergency Medical Condition (MA) Reason for Exam: Fall Additional signs and symptoms: Overdose on fentanyl, found pulseless with agonal breathing. CPR given on site; patient c/o mid sternal chest pain and nausea. FINDINGS: Findings on CT scan of brain, without  contrast: BRAIN/VENTRICLES: There is no acute intracranial hemorrhage, mass effect or midline shift. No abnormal extra-axial fluid collection. The gray-white differentiation is maintained without evidence of an acute infarct. There is no evidence of hydrocephalus. ORBITS: The visualized portion of the orbits demonstrate no acute abnormality. SINUSES: Mild to moderate mucosal thickening seen in the maxillary sinuses, and ethmoidal sinuses, without fluid level. Remaining paranasal sinuses are clear. SOFT TISSUES/SKULL:  No acute abnormality of the visualized skull or soft tissues. Findings on CT scan of cervical spine: Cervical spine is straightened on the digital images. On the coronal images, there is mild bending of the cervical spine with mild convexity to the right. Cervical vertebral bodies are of normal height. There is no evidence of fracture or compression or osseous malalignment in the cervical spine. Intact odontoid process, the atlantal dental joint, the craniovertebral junctions and bilateral cervical facet joints.  No demonstrable abnormal at C1-C2, C2-C3, C3-C4, C4-C5 and C5-C6 levels. At C6-C7 level, evidence of moderate degenerative disc disease with mild to moderate posterior disc bulge and mild to moderate posterior osteophytes from endplates. Mild central stenosis. Mild left neural foraminal stenosis. No right neural foraminal stenosis. At C7-T1 and T1-T2 level, no demonstrable abnormality. No acute process in the visualized apices of the lungs. In the soft tissues of neck, there is no diagnostic findings. CT scan of chest, with contrast-: Evidence of fractures in the anterior ends of the left 2nd, 3rd and 4th ribs. No evidence of fracture in the right ribs, thoracic spine or sternum. There is no acute process in the lungs. No pleural effusion or pneumothorax. Mild emphysematous changes in lungs, right more than left. In the posterolateral portion of right pulmonary lower lobe, there is a noncalcified pulmonary nodule 5.9 mm in diameter. Normal thoracic aorta. No evidence of pulmonary embolism in the opacified central pulmonary arteries and their central branches. No evidence of mediastinal mass or hemorrhage. No pericardial effusion. Normal cardiac size. CT scan of abdomen and pelvis with contrast: No evidence of intraperitoneal or retroperitoneal hemorrhage in abdomen and pelvis. No evidence of visceral injury in the abdomen or in the pelvis. In the gallbladder, there is a stone measuring 1.8 cm in diameter. Normal liver, spleen, pancreas, bilateral adrenal glands and bilateral kidneys. Normal abdominal aorta and branches. Normal pelvic arteries. No evidence of abdominal or pelvic pathologic lymphadenopathy. No diagnostic finding in the stomach. Minimal gas and mildly increased fluid contents in the distal small bowel. Normal appendix posteroinferior to cecum. Evidence of moderate amount of stool and small amount of gas are scattered in the proximal and mid colon. Small amount of stool and gas in the distal colon.   No evidence of diverticulosis coli or colitis. No evidence of urinary calculus or obstructive uropathy. No evidence of pelvic inflammation or abnormal fluid collection. No evidence of acute fracture in lumbar spine, pelvic bones and hip joints. No soft tissue hematoma or focal abnormality in the abdominal wall, pelvic wall or soft tissues of back. No evidence of intracranial hemorrhage or any other definable acute intracranial abnormality. No evidence of acute fracture or malalignment in the cervical spine. Fractures at the anterior ends of the left 2nd, 3rd, and 4th ribs. No pneumothorax or pleural effusion. Mild pulmonary emphysema without any acute process in the lungs. Normal thoracic aorta and mediastinal structures. 1.8 cm size calculus in the gallbladder. No evidence of internal hemorrhage or visceral injury in abdomen or pelvis. Normal abdominal aorta. Mild increased fluid in the distal small bowel. Normal appendix visualized. No evidence of fracture in the visualized bony structure of the thoracic spine, lumbar spine and pelvis. CT CSpine W/O Contrast    Result Date: 8/26/2022  EXAMINATION: CT OF THE HEAD WITHOUT CONTRAST; CT OF THE CERVICAL SPINE WITHOUT CONTRAST; CT OF THE CHEST, ABDOMEN, AND PELVIS WITH CONTRAST  8/25/2022 10:31 pm TECHNIQUE: CT of the head was performed without the administration of intravenous contrast. Automated exposure control, iterative reconstruction, and/or weight based adjustment of the mA/kV was utilized to reduce the radiation dose to as low as reasonably achievable.; CT of the cervical spine was performed without the administration of intravenous contrast. Multiplanar reformatted images are provided for review.  Automated exposure control, iterative reconstruction, and/or weight based adjustment of the mA/kV was utilized to reduce the radiation dose to as low as reasonably achievable.; CT of the chest, abdomen and pelvis was performed with the administration of intravenous contrast. Multiplanar reformatted images are provided for review. Automated exposure control, iterative reconstruction, and/or weight based adjustment of the mA/kV was utilized to reduce the radiation dose to as low as reasonably achievable. COMPARISON: CT scan of head without contrast on 10/07/2015. CT scan of the neck on 10/18/2014. HISTORY: ORDERING SYSTEM PROVIDED HISTORY: fall TECHNOLOGIST PROVIDED HISTORY: fall Decision Support Exception - unselect if not a suspected or confirmed emergency medical condition->Emergency Medical Condition (MA) Reason for Exam: fall Additional signs and symptoms: Overdose, found on ground; c/o headache and neck soreness. ; ORDERING SYSTEM PROVIDED HISTORY: fall TECHNOLOGIST PROVIDED HISTORY: fall Decision Support Exception - unselect if not a suspected or confirmed emergency medical condition->Emergency Medical Condition (MA) Reason for Exam: Fall Additional signs and symptoms: Overdose, found on ground; c/o headache and neck soreness.; ORDERING SYSTEM PROVIDED HISTORY: fall TECHNOLOGIST PROVIDED HISTORY: fall Decision Support Exception - unselect if not a suspected or confirmed emergency medical condition->Emergency Medical Condition (MA) Reason for Exam: Fall Additional signs and symptoms: Overdose on fentanyl, found pulseless with agonal breathing. CPR given on site; patient c/o mid sternal chest pain and nausea. FINDINGS: Findings on CT scan of brain, without  contrast: BRAIN/VENTRICLES: There is no acute intracranial hemorrhage, mass effect or midline shift. No abnormal extra-axial fluid collection. The gray-white differentiation is maintained without evidence of an acute infarct. There is no evidence of hydrocephalus. ORBITS: The visualized portion of the orbits demonstrate no acute abnormality. SINUSES: Mild to moderate mucosal thickening seen in the maxillary sinuses, and ethmoidal sinuses, without fluid level. Remaining paranasal sinuses are clear.  SOFT TISSUES/SKULL:  No acute abnormality of the visualized skull or soft tissues. Findings on CT scan of cervical spine: Cervical spine is straightened on the digital images. On the coronal images, there is mild bending of the cervical spine with mild convexity to the right. Cervical vertebral bodies are of normal height. There is no evidence of fracture or compression or osseous malalignment in the cervical spine. Intact odontoid process, the atlantal dental joint, the craniovertebral junctions and bilateral cervical facet joints. No demonstrable abnormal at C1-C2, C2-C3, C3-C4, C4-C5 and C5-C6 levels. At C6-C7 level, evidence of moderate degenerative disc disease with mild to moderate posterior disc bulge and mild to moderate posterior osteophytes from endplates. Mild central stenosis. Mild left neural foraminal stenosis. No right neural foraminal stenosis. At C7-T1 and T1-T2 level, no demonstrable abnormality. No acute process in the visualized apices of the lungs. In the soft tissues of neck, there is no diagnostic findings. CT scan of chest, with contrast-: Evidence of fractures in the anterior ends of the left 2nd, 3rd and 4th ribs. No evidence of fracture in the right ribs, thoracic spine or sternum. There is no acute process in the lungs. No pleural effusion or pneumothorax. Mild emphysematous changes in lungs, right more than left. In the posterolateral portion of right pulmonary lower lobe, there is a noncalcified pulmonary nodule 5.9 mm in diameter. Normal thoracic aorta. No evidence of pulmonary embolism in the opacified central pulmonary arteries and their central branches. No evidence of mediastinal mass or hemorrhage. No pericardial effusion. Normal cardiac size. CT scan of abdomen and pelvis with contrast: No evidence of intraperitoneal or retroperitoneal hemorrhage in abdomen and pelvis. No evidence of visceral injury in the abdomen or in the pelvis. In the gallbladder, there is a stone measuring 1.8 cm in diameter.  Normal liver, spleen, pancreas, bilateral adrenal glands and bilateral kidneys. Normal abdominal aorta and branches. Normal pelvic arteries. No evidence of abdominal or pelvic pathologic lymphadenopathy. No diagnostic finding in the stomach. Minimal gas and mildly increased fluid contents in the distal small bowel. Normal appendix posteroinferior to cecum. Evidence of moderate amount of stool and small amount of gas are scattered in the proximal and mid colon. Small amount of stool and gas in the distal colon. No evidence of diverticulosis coli or colitis. No evidence of urinary calculus or obstructive uropathy. No evidence of pelvic inflammation or abnormal fluid collection. No evidence of acute fracture in lumbar spine, pelvic bones and hip joints. No soft tissue hematoma or focal abnormality in the abdominal wall, pelvic wall or soft tissues of back. No evidence of intracranial hemorrhage or any other definable acute intracranial abnormality. No evidence of acute fracture or malalignment in the cervical spine. Fractures at the anterior ends of the left 2nd, 3rd, and 4th ribs. No pneumothorax or pleural effusion. Mild pulmonary emphysema without any acute process in the lungs. Normal thoracic aorta and mediastinal structures. 1.8 cm size calculus in the gallbladder. No evidence of internal hemorrhage or visceral injury in abdomen or pelvis. Normal abdominal aorta. Mild increased fluid in the distal small bowel. Normal appendix visualized. No evidence of fracture in the visualized bony structure of the thoracic spine, lumbar spine and pelvis. XR CHEST PORTABLE    Result Date: 8/27/2022  EXAMINATION: ONE XRAY VIEW OF THE CHEST 8/27/2022 7:36 am COMPARISON: 08/25/2022 HISTORY: ORDERING SYSTEM PROVIDED HISTORY: pain/rib fx TECHNOLOGIST PROVIDED HISTORY: pain/rib fx Reason for Exam: Middle chest pains following CPR FINDINGS: The cardiac silhouette, mediastinal and hilar contours are normal.  The lungs are clear.  There are no pleural effusions. No acute osseous abnormalities are identified. No acute cardiopulmonary disease. XR CHEST PORTABLE    Result Date: 8/25/2022  EXAMINATION: ONE XRAY VIEW OF THE CHEST 8/25/2022 10:59 pm COMPARISON: 10/09/2021 HISTORY: ORDERING SYSTEM PROVIDED HISTORY: overdose TECHNOLOGIST PROVIDED HISTORY: overdose Reason for Exam: Overdose, PT will not hold still or allow me to place board in proper position without moving or sliding off the board. Multiple attempts made with best image sent per pt condition and cooperation at this time. FINDINGS: Evaluation limited due to nonvisualization of the left costophrenic angle and slight motion artifact, although are best possible given patient condition. No focal consolidations. No pleural effusion or pneumothorax visualized. Heart size is within normal limits. No acute process. CT CHEST ABDOMEN PELVIS W CONTRAST    Result Date: 8/26/2022  EXAMINATION: CT OF THE HEAD WITHOUT CONTRAST; CT OF THE CERVICAL SPINE WITHOUT CONTRAST; CT OF THE CHEST, ABDOMEN, AND PELVIS WITH CONTRAST  8/25/2022 10:31 pm TECHNIQUE: CT of the head was performed without the administration of intravenous contrast. Automated exposure control, iterative reconstruction, and/or weight based adjustment of the mA/kV was utilized to reduce the radiation dose to as low as reasonably achievable.; CT of the cervical spine was performed without the administration of intravenous contrast. Multiplanar reformatted images are provided for review. Automated exposure control, iterative reconstruction, and/or weight based adjustment of the mA/kV was utilized to reduce the radiation dose to as low as reasonably achievable.; CT of the chest, abdomen and pelvis was performed with the administration of intravenous contrast. Multiplanar reformatted images are provided for review.  Automated exposure control, iterative reconstruction, and/or weight based adjustment of the mA/kV was utilized to reduce the radiation dose to as low as reasonably achievable. COMPARISON: CT scan of head without contrast on 10/07/2015. CT scan of the neck on 10/18/2014. HISTORY: ORDERING SYSTEM PROVIDED HISTORY: fall TECHNOLOGIST PROVIDED HISTORY: fall Decision Support Exception - unselect if not a suspected or confirmed emergency medical condition->Emergency Medical Condition (MA) Reason for Exam: fall Additional signs and symptoms: Overdose, found on ground; c/o headache and neck soreness. ; ORDERING SYSTEM PROVIDED HISTORY: fall TECHNOLOGIST PROVIDED HISTORY: fall Decision Support Exception - unselect if not a suspected or confirmed emergency medical condition->Emergency Medical Condition (MA) Reason for Exam: Fall Additional signs and symptoms: Overdose, found on ground; c/o headache and neck soreness.; ORDERING SYSTEM PROVIDED HISTORY: fall TECHNOLOGIST PROVIDED HISTORY: fall Decision Support Exception - unselect if not a suspected or confirmed emergency medical condition->Emergency Medical Condition (MA) Reason for Exam: Fall Additional signs and symptoms: Overdose on fentanyl, found pulseless with agonal breathing. CPR given on site; patient c/o mid sternal chest pain and nausea. FINDINGS: Findings on CT scan of brain, without  contrast: BRAIN/VENTRICLES: There is no acute intracranial hemorrhage, mass effect or midline shift. No abnormal extra-axial fluid collection. The gray-white differentiation is maintained without evidence of an acute infarct. There is no evidence of hydrocephalus. ORBITS: The visualized portion of the orbits demonstrate no acute abnormality. SINUSES: Mild to moderate mucosal thickening seen in the maxillary sinuses, and ethmoidal sinuses, without fluid level. Remaining paranasal sinuses are clear. SOFT TISSUES/SKULL:  No acute abnormality of the visualized skull or soft tissues. Findings on CT scan of cervical spine: Cervical spine is straightened on the digital images.   On the coronal images, there is mild bending of the cervical spine with mild convexity to the right. Cervical vertebral bodies are of normal height. There is no evidence of fracture or compression or osseous malalignment in the cervical spine. Intact odontoid process, the atlantal dental joint, the craniovertebral junctions and bilateral cervical facet joints. No demonstrable abnormal at C1-C2, C2-C3, C3-C4, C4-C5 and C5-C6 levels. At C6-C7 level, evidence of moderate degenerative disc disease with mild to moderate posterior disc bulge and mild to moderate posterior osteophytes from endplates. Mild central stenosis. Mild left neural foraminal stenosis. No right neural foraminal stenosis. At C7-T1 and T1-T2 level, no demonstrable abnormality. No acute process in the visualized apices of the lungs. In the soft tissues of neck, there is no diagnostic findings. CT scan of chest, with contrast-: Evidence of fractures in the anterior ends of the left 2nd, 3rd and 4th ribs. No evidence of fracture in the right ribs, thoracic spine or sternum. There is no acute process in the lungs. No pleural effusion or pneumothorax. Mild emphysematous changes in lungs, right more than left. In the posterolateral portion of right pulmonary lower lobe, there is a noncalcified pulmonary nodule 5.9 mm in diameter. Normal thoracic aorta. No evidence of pulmonary embolism in the opacified central pulmonary arteries and their central branches. No evidence of mediastinal mass or hemorrhage. No pericardial effusion. Normal cardiac size. CT scan of abdomen and pelvis with contrast: No evidence of intraperitoneal or retroperitoneal hemorrhage in abdomen and pelvis. No evidence of visceral injury in the abdomen or in the pelvis. In the gallbladder, there is a stone measuring 1.8 cm in diameter. Normal liver, spleen, pancreas, bilateral adrenal glands and bilateral kidneys. Normal abdominal aorta and branches. Normal pelvic arteries.  No evidence of abdominal or pelvic pathologic lymphadenopathy. No diagnostic finding in the stomach. Minimal gas and mildly increased fluid contents in the distal small bowel. Normal appendix posteroinferior to cecum. Evidence of moderate amount of stool and small amount of gas are scattered in the proximal and mid colon. Small amount of stool and gas in the distal colon. No evidence of diverticulosis coli or colitis. No evidence of urinary calculus or obstructive uropathy. No evidence of pelvic inflammation or abnormal fluid collection. No evidence of acute fracture in lumbar spine, pelvic bones and hip joints. No soft tissue hematoma or focal abnormality in the abdominal wall, pelvic wall or soft tissues of back. No evidence of intracranial hemorrhage or any other definable acute intracranial abnormality. No evidence of acute fracture or malalignment in the cervical spine. Fractures at the anterior ends of the left 2nd, 3rd, and 4th ribs. No pneumothorax or pleural effusion. Mild pulmonary emphysema without any acute process in the lungs. Normal thoracic aorta and mediastinal structures. 1.8 cm size calculus in the gallbladder. No evidence of internal hemorrhage or visceral injury in abdomen or pelvis. Normal abdominal aorta. Mild increased fluid in the distal small bowel. Normal appendix visualized. No evidence of fracture in the visualized bony structure of the thoracic spine, lumbar spine and pelvis. Consultations:    Consults:     Final Specialist Recommendations/Findings:   IP CONSULT TO GENERAL SURGERY  IP CONSULT TO SOCIAL WORK  IP CONSULT TO PULMONOLOGY      The patient was seen and examined on day of discharge and this discharge summary is in conjunction with any daily progress note from day of discharge. Discharge plan:     Disposition: Home    Physician Follow Up:     No follow-up provider specified.      Requiring Further Evaluation/Follow Up POST HOSPITALIZATION/Incidental Findings:    Diet: cardiac diet    Activity: As tolerated    Instructions to Patient:     Discharge Medications:      Medication List        START taking these medications      HYDROcodone-acetaminophen 5-325 MG per tablet  Commonly known as: Norco  Take 1 tablet by mouth every 4 hours as needed for Pain for up to 3 days. Intended supply: 3 days. Take lowest dose possible to manage pain     lidocaine 4 % external patch  Place 1 patch onto the skin daily  Start taking on: August 28, 2022     ondansetron 4 MG disintegrating tablet  Commonly known as: ZOFRAN-ODT  Take 1 tablet by mouth every 8 hours as needed for Nausea or Vomiting            CONTINUE taking these medications      albuterol (2.5 MG/3ML) 0.083% nebulizer solution  Commonly known as: PROVENTIL     ALPRAZolam 0.25 MG tablet  Commonly known as: XANAX     ibuprofen 800 MG tablet  Commonly known as: ADVIL;MOTRIN  Take 1 tablet by mouth every 8 hours as needed for Pain     orphenadrine 100 MG extended release tablet  Commonly known as: NORFLEX  Take 1 tablet by mouth 2 times daily            STOP taking these medications      fluticasone 50 MCG/ACT nasal spray  Commonly known as: Flonase     lurasidone 40 MG Tabs tablet  Commonly known as: LATUDA     pseudoephedrine 60 MG tablet  Commonly known as: SUDAFED     sertraline 25 MG tablet  Commonly known as: ZOLOFT               Where to Get Your Medications        You can get these medications from any pharmacy    Bring a paper prescription for each of these medications  HYDROcodone-acetaminophen 5-325 MG per tablet  lidocaine 4 % external patch  ondansetron 4 MG disintegrating tablet         Time Spent on discharge is  35 mins in patient examination, evaluation, counseling as well as medication reconciliation, prescriptions for required medications, discharge plan and follow up.     Electronically signed by   Tiff Foster MD  8/27/2022  4:27 PM      Thank you Dr. Mona Judge for the opportunity to be involved in this patient's care.

## 2022-08-27 NOTE — PROGRESS NOTES
[address]    Occupational Therapy Evaluation  Date: 22  Patient Name: Jean Walters       Room: 4975/2418-29  MRN: [id number]  Account: [de-identified]   : 1985  (44 y.o.) Gender: male     Discharge Recommendations: The patient may need non-skilled ADL assistance after discharge. Referring Practitioner: Max Lomax DO  Diagnosis: Trauma; Rib injury; Hypoxia; Closed fracture of multiple ribs of left side, initial encounter; Opiate overdose, accidental or unintentional, initial encounter Additional Pertinent Hx: Daryle Mallet is a 69-year-old male who presented after being found down after fentanyl overdose. CPR was performed and he fractured ribs 2-3. The patient complains of left-sided rib pain and difficulty taking a deep breath because of coughing. He denies any fevers, chills, cough or sputum production prior to coming into the hospital.  A chest x-ray done today which I personally reviewed shows no obvious pulmonary contusion or infiltrate. The patient denies any hemoptysis. Past Medical History:  has a past medical history of Asthma. Past Surgical History:   has no past surgical history on file.     Restrictions  Restrictions/Precautions  Restrictions/Precautions: Up as Tolerated;General Precautions  Required Braces or Orthoses?: No  Implants present? :  (Pt denies)  Position Activity Restriction  Other position/activity restrictions: OT/PT eval and treat      Vitals  Vitals  Heart Rate: 70  Heart Rate Source: Monitor  BP: 125/68  BP Location: Left upper arm  BP Method: Automatic  Patient Position: Semi fowlers  MAP (Calculated): 87  Resp: 18  SpO2: 99 %  O2 Device: None (Room air)     Subjective  Comments: Okay for OT/PT eval per SELMA Freedman  Subjective  Pain: 6/10 L side of chest      Social/Functional History  Social/Functional History  Lives With: Parent (Patient staying in father's room)  Type of Home:  ([facility] [place] [facility])  Home Independent  Sit to Supine: Independent  Scooting: Independent  Bed Mobility Comments: HOB slightly elevated    Balance  Balance  Sitting Balance: Modified independent   Standing Balance: Supervision  Standing Balance  Time: ~5 minutes  Activity: Functional transfers, functional mobility  Comment: Good hand placement and body mechanics    Transfers  Transfers  Sit to stand: Supervision  Stand to sit: Supervision  Transfer Comments: Good hand placement and body mechanics    Functional Mobility  Functional - Mobility Device: No device  Activity:  (In room, into hallway)  Assist Level: Supervision  Functional Mobility Comments: Good safety    Assessment  Assessment  Assessment: Pt with slight LOB but able to self-correct. Pt is functioning at baseline level with no concerns with completion of self-care. No OT goals identified and no skilled OT required at this time.   Prognosis: Good  Decision Making: Low Complexity  No Skilled OT: Independent with ADL's, At baseline function, Safe to return home, No OT goals identified    Activity Tolerance  Activity Tolerance: Patient Tolerated treatment well    Safety Devices  Type of Devices: Left in bed, Gait belt, Nurse notified, Call light within reach    Patient Education  Patient Education  Education Given To: Patient  Education Provided: Role of Therapy, Transfer Training  Education Method: Verbal  Barriers to Learning: None  Education Outcome: Verbalized understanding      Functional Outcome Measures  AM-PAC Daily Activity Inpatient   How much help for putting on and taking off regular lower body clothing?: A Little  How much help for Bathing?: A Little  How much help for Toileting?: A Little  How much help for putting on and taking off regular upper body clothing?: None  How much help for taking care of personal grooming?: None  How much help for eating meals?: None  AM-[facility] Inpatient Daily Activity Raw Score: 21  AM-PAC Inpatient ADL T-Scale Score : 44.27  ADL Inpatient CMS 0-100% Score: 32.79  ADL Inpatient CMS G-Code Modifier : CJ    OT Individual Minutes  OT Individual Minutes  Time In: 7054  Time Out: 4413  Minutes: 16  Time Code Minutes   Timed Code Treatment Minutes: 10 Minutes    Electronically signed by MARCEL Nicole on 8/27/22 at 3:50 PM EDT

## 2022-08-27 NOTE — PROGRESS NOTES
Nurse notified Emi Ott NP that Trop went from 16-44. Trop ordered for AM to make sure it is trending down.

## 2022-08-27 NOTE — CARE COORDINATION
ONGOING DISCHARGE PLAN:    Patient is alert and oriented x4. Spoke with patient regarding discharge plan and patient confirms that plan is still to discharge to home with no needs  Pulmonary Toilet   WIll need a Ct Chest in 3 mos   2 rib fx   Outpatient testing for alpha-1 antitrypsin    Will continue to follow for additional discharge needs.     Electronically signed by Mami Mayer RN on 8/27/2022 at 3:08 PM

## 2022-08-27 NOTE — CONSULTS
[facility]   CONSULT NOTE:      DATE OF CONSULT 8/27/2022    REASON FOR CONSULTATION:  Pulmonary management      PCP Destiney Wen     CHIEF COMPLAINT: Fractured ribs    HISTORY OF PRESENT ILLNESS:     Liv is a 79-year-old male who presented after being found down after fentanyl overdose. CPR was performed and he fractured ribs 2-3. The patient complains of left-sided rib pain and difficulty taking a deep breath because of coughing. He denies any fevers, chills, cough or sputum production prior to coming into the hospital.  A chest x-ray done today which I personally reviewed shows no obvious pulmonary contusion or infiltrate. The patient denies any hemoptysis. He is a smoker 1 pack/day, but he is only smoked for about 10 years. Previously however, he did chew tobacco.  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 He also has used cocaine, crystal meth, and marijuana in the past.    He has no documented asthma as a child or young adult. ALLERGIES:  Allergies   Allergen Reactions    Endocet [Oxycodone-Acetaminophen] Other (See Comments)     Chest preasure       HOME MEDICATIONS:  Medications Prior to Admission: [DISCONTINUED] ibuprofen (IBU) 400 MG tablet, Take 1 tablet by mouth every 6 hours as needed for Pain (Patient not taking: Reported on 8/2/2022)  orphenadrine (NORFLEX) 100 MG extended release tablet, Take 1 tablet by mouth 2 times daily  [DISCONTINUED] lidocaine (LIDODERM) 5 %, Place 1 patch onto the skin daily 12 hours on, 12 hours off.  (Patient not taking: Reported on 8/2/2022)  ibuprofen (ADVIL;MOTRIN) 800 MG tablet, Take 1 tablet by mouth every 8 hours as needed for Pain (Patient not taking: No sig reported)  lurasidone (LATUDA) 40 MG TABS tablet, Take 40 mg by mouth daily (Patient not taking: No sig reported)  fluticasone (FLONASE) 50 MCG/ACT nasal spray, 1 spray by Nasal route 2 times daily (Patient not taking: No sig reported)  pseudoephedrine (SUDAFED) 60 MG tablet, Take 1 tablet by mouth every 6 hours as needed for Congestion (Patient not taking: No sig reported)  albuterol (PROVENTIL) (2.5 MG/3ML) 0.083% nebulizer solution, Take 2.5 mg by nebulization every 6 hours as needed for Wheezing. ALPRAZolam (XANAX) 0.25 MG tablet, Take 1 mg by mouth 3 times daily as needed for Sleep. PAST MEDICAL HISTORY:  Past Medical History:   Diagnosis Date    Asthma        PAST SURGICAL HISTORY:  History reviewed. No pertinent surgical history. SOCIAL HISTORY:  Social History     Socioeconomic History    Marital status: Legally      Spouse name: Not on file    Number of children: Not on file    Years of education: Not on file    Highest education level: Not on file   Occupational History    Not on file   Tobacco Use    Smoking status: Every Day     Packs/day: 1.00     Types: Cigarettes    Smokeless tobacco: Former     Types: Chew    Tobacco comments:     reports using 1 can in 3 days   Substance and Sexual Activity    Alcohol use: Yes     Comment: daily    Drug use: Yes     Types: Marijuana (Lynne Croft), Cocaine, Methamphetamines (Crystal Meth)     Comment: crack/fentanyl    Sexual activity: Yes   Other Topics Concern    Not on file   Social History Narrative    Not on file     Social Determinants of Health     Financial Resource Strain: Not on file   Food Insecurity: Not on file   Transportation Needs: Not on file   Physical Activity: Not on file   Stress: Not on file   Social Connections: Not on file   Intimate Partner Violence: Not on file   Housing Stability: Not on file       FAMILY HISTORY:  History reviewed. No pertinent family history. REVIEW OF SYSTEMS:  All other systems reviewed and are negative. PHYSICAL EXAM:  Vital Signs Blood pressure 122/77, pulse 63, temperature 98.4 °F (36.9 °C), temperature source Oral, resp. rate 19, height 5' 11\" (1.803 m), weight 204 lb 12.9 oz (92.9 kg), SpO2 96 %.   Oxygen Amount and Delivery: O2 Flow Rate (L/min): 1 L/min (O2 turned off and pt on RA)    Admission Weight Weight: 196 lb (88.9 kg)    General Appearance     Head  Normocephalic, without obvious abnormality, atraumatic    Eyes  conjunctivae/corneas clear. PERRL, EOM's intact. ENT clear  Neck  no adenopathy, no carotid bruit, no JVD, supple, symmetrical, trachea midline and thyroid not enlarged, symmetric, no tenderness/mass/nodules  Lungs diminished but clear anteriorly and posteriorly no wheezes or rhonchi  Heart: regular rate and rhythm, S1, S2 normal, no murmur, click, rub or gallop  Abdomen  soft, non-tender; bowel sounds normal; no masses,  no organomegaly  Extremities  No edema  Skin  Skin color, texture, turgor normal. No rashes or lesions  Neurologic: Alert and oriented X 3, normal strength and tone.          Imaging  Chest x-ray is clear without any new infiltrates    CT of chest shows left fractured ribs        Eczematous changes in the upper lobes    Right lower lobe pulmonary nodule      Lab Review  CBC     Lab Results   Component Value Date/Time    WBC 8.2 08/27/2022 05:37 AM    RBC 4.49 08/27/2022 05:37 AM    HGB 14.2 08/27/2022 05:37 AM    HCT 42.3 08/27/2022 05:37 AM     08/27/2022 05:37 AM    MCV 94.2 08/27/2022 05:37 AM    MCH 31.6 08/27/2022 05:37 AM    MCHC 33.5 08/27/2022 05:37 AM    RDW 14.4 08/27/2022 05:37 AM    METASPCT 2 08/25/2022 10:51 PM    LYMPHOPCT 30 08/25/2022 10:51 PM    MONOPCT 5 08/25/2022 10:51 PM    MYELOPCT 1 08/25/2022 10:51 PM    BASOPCT 1 08/25/2022 10:51 PM    MONOSABS 0.63 08/25/2022 10:51 PM    LYMPHSABS 3.75 08/25/2022 10:51 PM    EOSABS 0.25 08/25/2022 10:51 PM    BASOSABS 0.13 08/25/2022 10:51 PM    DIFFTYPE NOT REPORTED 06/13/2018 02:05 AM       BMP   Lab Results   Component Value Date/Time     08/27/2022 05:37 AM    K 4.7 08/27/2022 05:37 AM     08/27/2022 05:37 AM    CO2 29 08/27/2022 05:37 AM    BUN 19 08/27/2022 05:37 AM    CREATININE 0.90 08/27/2022 05:37 AM    GLUCOSE 126 08/27/2022 05:37 AM    CALCIUM 8.7 08/27/2022 05:37 AM       LFTS  Lab Results   Component Value Date/Time    [facility] 128 08/25/2022 10:51 PM    ALT 21 08/25/2022 10:51 PM    AST 27 08/25/2022 10:51 PM    PROT 7.0 08/25/2022 10:51 PM    BILITOT 0.68 08/25/2022 10:51 PM    LABALBU 4.4 08/25/2022 10:51 PM       INR  Recent Labs     08/25/22  2251   PROTIME 13.9   INR 1.1       APTT  No results for input(s): APTT in the last 72 hours. Lactic Acid  No results found for: LACTA     PRO-BNP   No results for input(s): PROBNP in the last 72 hours.         ABGs: No results found for: PHART, PO2ART, [id number]    No results found for: IFIO2, MODE, SETTIDVOL, SETPEEP      Impression    Fentanyl overdose  Fractured ribs left side 2-3; after CPR done  Right lower lobe pulmonary nodule-6 mm  Emphysematous changes on CAT scan of the chest out of proportion to his age  History of tobacco use  History of polysubstance abuse    Plan:      Agree with incentive spirometry and pain control  Can place on antitussives  Will need follow-up CT of chest in 3 months  Outpatient testing for alpha-1 antitrypsin  Smoking cessation-offered nicotine patch he does not want 1  Polysubstance abuse counseling  Outpatient pulmonary function testing

## 2022-08-27 NOTE — H&P
RIDDHI TARANGO [facility] Internal Medicine  Sulema Molina MD; Kayley Edward MD; Lorrie Mcdermott MD; MD Zaire Milton MD; Renella Boeck, MD  [facility]   [address] [facility]     HISTORY AND PHYSICAL EXAMINATION            Date:   8/27/2022  Patientname:  Jose Juan Tapia  Date of admission:  8/25/2022 10:39 PM  MRN:   [id number]  Account:  [de-identified]  YOB: 1985  PCP:    Deandre Gonzales  Room:   2093/2093-01  Code Status:    Full Code      Chief Complaint:     Chief Complaint   Patient presents with    Drug Overdose       History Obtained From:     patient, electronic medical record    History of Present Illness:     Jose Juan Tapia is a 44 y.o. Non- / non  male who presents with Drug Overdose and is admitted to the hospital for the management of Rib injury. Past medical history includes asthma. Patient was found down with fentanyl overdose. CPR performed at scene and Narcan administered. In ED, tox screen positive for amphetamine, cocaine and fentanyl. CT chest with evidence of broken fractured left 2nd -4th ribs. No other injuries noted on CT head, chest or abdomen. Trauma surgery also following patient. Patient currently being weaned from oxygen. Denies shortness of breath and tolerating weaning well. Receiving IV Toradol for pain. Reports left rib pain increased with cough or movement. Denies fever, chills, chest pain, cough, abdominal pain, nausea, vomiting, diarrhea, and urinary symptoms. Symptoms are aggravated by increased movement or cough. When discussing polysubstance abuse and overdose patient is limited in reply. Symptoms are alleviated by pain medication and rest. Symptoms are reported as acute, intermittent and moderate in severity. Admits that he is not taking any home medications except for albuterol inhaler.        Past Medical History:     Past Medical History:   Diagnosis Date    Asthma         Past Surgical History:     History reviewed. No pertinent surgical history. Medications Prior to Admission:     Prior to Admission medications    Medication Sig Start Date End Date Taking? Authorizing Provider   orphenadrine (NORFLEX) 100 MG extended release tablet Take 1 tablet by mouth 2 times daily 9/13/18   Sultana Johnson MD   ibuprofen (ADVIL;MOTRIN) 800 MG tablet Take 1 tablet by mouth every 8 hours as needed for Pain  Patient not taking: No sig reported 6/23/16   Niko Sorenson PA-C   lurasidone (LATUDA) 40 MG TABS tablet Take 40 mg by mouth daily  Patient not taking: No sig reported    Historical Provider, MD   fluticasone (FLONASE) 50 MCG/ACT nasal spray 1 spray by Nasal route 2 times daily  Patient not taking: No sig reported 10/7/15   Nohemy Muhammad MD   pseudoephedrine (SUDAFED) 60 MG tablet Take 1 tablet by mouth every 6 hours as needed for Congestion  Patient not taking: No sig reported 10/7/15   Nohemy Muhammad MD   albuterol (PROVENTIL) (2.5 MG/3ML) 0.083% nebulizer solution Take 2.5 mg by nebulization every 6 hours as needed for Wheezing. Historical Provider, MD   ALPRAZolam Valaria Paling) 0.25 MG tablet Take 1 mg by mouth 3 times daily as needed for Sleep. Historical Provider, MD        Allergies:     Endocet [oxycodone-acetaminophen]    Social History:     Tobacco:    reports that he has been smoking cigarettes. He has been smoking an average of 1 pack per day. He has quit using smokeless tobacco.  His smokeless tobacco use included chew. Alcohol:      reports current alcohol use. Drug Use:  reports current drug use. Drugs: Marijuana Candiss Littler), Cocaine, and Methamphetamines (Crystal Meth). Family History:     History reviewed. No pertinent family history. REVIEW OF SYSTEMS     Review of Systems   Constitutional:  Negative for chills, diaphoresis and fever. HENT:  Negative for congestion and sore throat. Respiratory:  Negative for cough, shortness of breath and wheezing. Left rib pain   Cardiovascular:  Negative for chest pain, palpitations and leg swelling. Gastrointestinal:  Negative for abdominal pain, constipation, diarrhea, nausea and vomiting. Genitourinary:  Negative for dysuria, frequency and urgency. Musculoskeletal:  Negative for back pain and myalgias. Skin:  Negative for rash. Neurological:  Negative for dizziness, weakness and headaches. Psychiatric/Behavioral:  The patient is not nervous/anxious. PHYSICAL EXAM      /67   Pulse 61   Temp 98.6 °F (37 °C) (Oral)   Resp 17   Ht 5' 11\" (1.803 m)   Wt 190 lb 14.7 oz (86.6 kg)   SpO2 94%   BMI 26.63 kg/m²  Body mass index is 26.63 kg/m². Physical Exam  Vitals and nursing note reviewed. Constitutional:       General: He is not in acute distress. Appearance: He is well-developed. He is not diaphoretic. HENT:      Head: Normocephalic and atraumatic. Eyes:      Conjunctiva/sclera: Conjunctivae normal.      Pupils: Pupils are equal, round, and reactive to light. Neck:      Trachea: No tracheal deviation. Cardiovascular:      Rate and Rhythm: Normal rate and regular rhythm. Heart sounds: Normal heart sounds. No murmur heard. No friction rub. No gallop. Pulmonary:      Effort: Pulmonary effort is normal. No respiratory distress. Breath sounds: Normal breath sounds. No wheezing or rales. Comments: Tenderness over left ribs   Chest:      Chest wall: No tenderness. Abdominal:      General: Bowel sounds are normal. There is no distension. Palpations: Abdomen is soft. Tenderness: There is no abdominal tenderness. There is no guarding. Musculoskeletal:         General: No tenderness. Normal range of motion. Cervical back: Normal range of motion and neck supple. Lymphadenopathy:      Cervical: No cervical adenopathy. Skin:     General: Skin is warm and dry. Coloration: Skin is not pale. Findings: No erythema or rash.    Neurological: Mental Status: He is alert and oriented to person, place, and time. Motor: No seizure activity. Coordination: Coordination normal.   Psychiatric:         Behavior: Behavior normal.         Thought Content: Thought content normal.         Investigations:      Laboratory Testing:  Recent Results (from the past 24 hour(s))   Urinalysis    Collection Time: 08/26/22  3:12 AM   Result Value Ref Range    Color, UA Yellow Yellow    Turbidity UA Clear Clear    Glucose, Ur NEGATIVE NEGATIVE    Bilirubin Urine NEGATIVE NEGATIVE    Ketones, Urine TRACE (A) NEGATIVE    Specific Gravity, UA 1.065 (H) 1.000 - 1.030    Urine Hgb NEGATIVE NEGATIVE    pH, UA 5.0 5.0 - 8.0    Protein, UA TRACE (A) NEGATIVE    Urobilinogen, Urine Normal Normal    Nitrite, Urine NEGATIVE NEGATIVE    Leukocyte Esterase, Urine NEGATIVE NEGATIVE   Urine Drug Screen    Collection Time: 08/26/22  3:12 AM   Result Value Ref Range    Amphetamine Screen, Ur POSITIVE (A) NEGATIVE    Barbiturate Screen, Ur NEGATIVE NEGATIVE    Benzodiazepine Screen, Urine NEGATIVE NEGATIVE    Cocaine Metabolite, Urine POSITIVE (A) NEGATIVE    Methadone Screen, Urine NEGATIVE NEGATIVE    Opiates, Urine NEGATIVE NEGATIVE    Phencyclidine, Urine NEGATIVE NEGATIVE    Cannabinoid Scrn, Ur NEGATIVE NEGATIVE    Oxycodone Screen, Ur NEGATIVE NEGATIVE    Fentanyl, Ur POSITIVE (A) NEGATIVE    Test Information       Assay provides medical screening only. The absence of expected drug(s) and/or metabolite(s) may indicate diluted or adulterated urine, limitations of testing or timing of collection.    Microscopic Urinalysis    Collection Time: 08/26/22  3:12 AM   Result Value Ref Range    WBC, UA 0 TO 2 /HPF    RBC, UA 0 TO 2 /HPF    Casts UA 3 to 5 /LPF    Epithelial Cells UA 0 TO 2 /HPF    Bacteria, UA None None   Troponin    Collection Time: 08/26/22  4:16 AM   Result Value Ref Range    Troponin, High Sensitivity 44 (H) 0 - 22 ng/L       Imaging/Diagnostics:  CT Head W/O Contrast    Result Date: 8/26/2022  No evidence of intracranial hemorrhage or any other definable acute intracranial abnormality. No evidence of acute fracture or malalignment in the cervical spine. Fractures at the anterior ends of the left 2nd, 3rd, and 4th ribs. No pneumothorax or pleural effusion. Mild pulmonary emphysema without any acute process in the lungs. Normal thoracic aorta and mediastinal structures. 1.8 cm size calculus in the gallbladder. No evidence of internal hemorrhage or visceral injury in abdomen or pelvis. Normal abdominal aorta. Mild increased fluid in the distal small bowel. Normal appendix visualized. No evidence of fracture in the visualized bony structure of the thoracic spine, lumbar spine and pelvis. CT CSpine W/O Contrast    Result Date: 8/26/2022  No evidence of intracranial hemorrhage or any other definable acute intracranial abnormality. No evidence of acute fracture or malalignment in the cervical spine. Fractures at the anterior ends of the left 2nd, 3rd, and 4th ribs. No pneumothorax or pleural effusion. Mild pulmonary emphysema without any acute process in the lungs. Normal thoracic aorta and mediastinal structures. 1.8 cm size calculus in the gallbladder. No evidence of internal hemorrhage or visceral injury in abdomen or pelvis. Normal abdominal aorta. Mild increased fluid in the distal small bowel. Normal appendix visualized. No evidence of fracture in the visualized bony structure of the thoracic spine, lumbar spine and pelvis. XR CHEST PORTABLE    Result Date: 8/25/2022  No acute process. CT CHEST ABDOMEN PELVIS W CONTRAST    Result Date: 8/26/2022  No evidence of intracranial hemorrhage or any other definable acute intracranial abnormality. No evidence of acute fracture or malalignment in the cervical spine. Fractures at the anterior ends of the left 2nd, 3rd, and 4th ribs. No pneumothorax or pleural effusion.  Mild pulmonary emphysema without any acute process in the lungs. Normal thoracic aorta and mediastinal structures. 1.8 cm size calculus in the gallbladder. No evidence of internal hemorrhage or visceral injury in abdomen or pelvis. Normal abdominal aorta. Mild increased fluid in the distal small bowel. Normal appendix visualized. No evidence of fracture in the visualized bony structure of the thoracic spine, lumbar spine and pelvis. Assessment :      Hospital Problems             Last Modified POA    * (Principal) Rib injury 8/26/2022 Yes    Trauma 8/26/2022 Yes    Polysubstance abuse ([facility] [address] 8/27/2022 Yes    Accidental fentanyl overdose ([address] 8/27/2022 Yes    Cocaine abuse ([facility] [address] 8/27/2022 Yes       Plan:     Patient status inpatient in the Progressive Unit/Step down    Rib fx 2-4th  -hypoxia resolved with O2. Currently weaning at 1L per NC.   -Toradol IV and lidocaine patch for pain control  -splinting  -encourage cough and beep breathing  -trauma surgery following    Polysubstance abuse  -Social service consult for help with substance abuse  -watch for withdrawal symptoms, presented +etoh but denies daily etoh use    Full code    Lovenox for DVT prophylaxis    Disposition 1-2 days      Consultations:   IP CONSULT TO GENERAL SURGERY    Patient is admitted as inpatient status because of co-morbidities listed above, severity of signs and symptoms as outlined, requirement for current medical therapies and most importantly because of direct risk to patient if care not provided in a hospital setting. Expected length of stay > 48 hours. ORACIO Flores NP  8/27/2022  1:10 AM    Copy sent to Dr. Kendra Dowell    Please note that this chart was generated using voice recognition Dragon dictation software. Although every effort was made to ensure the accuracy of this automated transcription, some errors in transcription may have occurred. Kinga Walker [address] [place] [address].    Phone [phone]

## 2022-08-27 NOTE — PLAN OF CARE
Problem: Discharge Planning  Goal: Discharge to home or other facility with appropriate resources  Outcome: Progressing     Problem: Pain  Goal: Verbalizes/displays adequate comfort level or baseline comfort level  8/26/2022 2254 by Shahrzad Alejandra RN  Outcome: Progressing     Problem: Safety - Adult  Goal: Free from fall injury  Outcome: Progressing     Problem: ABCDS Injury Assessment  Goal: Absence of physical injury  Outcome: Progressing

## 2022-08-27 NOTE — PROGRESS NOTES
DC'd A&O, VS WNL except for left side chest pain d/t CPR chest compressions with all belongings and dc paperwork per [facility].

## 2023-01-08 ENCOUNTER — HOSPITAL ENCOUNTER (EMERGENCY)
Age: 38
Discharge: HOME | End: 2023-01-08
Attending: EMERGENCY MEDICINE
Payer: COMMERCIAL

## 2023-01-08 VITALS
SYSTOLIC BLOOD PRESSURE: 128 MMHG | OXYGEN SATURATION: 94 % | RESPIRATION RATE: 20 BRPM | WEIGHT: 204 LBS | HEIGHT: 74 IN | HEART RATE: 80 BPM | DIASTOLIC BLOOD PRESSURE: 82 MMHG | TEMPERATURE: 97 F | BODY MASS INDEX: 26.18 KG/M2

## 2023-01-08 DIAGNOSIS — T40.601A OPIATE OVERDOSE, ACCIDENTAL OR UNINTENTIONAL, INITIAL ENCOUNTER (HCC): Primary | ICD-10-CM

## 2023-01-08 PROCEDURE — 99283 EMERGENCY DEPT VISIT LOW MDM: CPT

## 2023-01-08 PROCEDURE — 6370000000 HC RX 637 (ALT 250 FOR IP): Performed by: EMERGENCY MEDICINE

## 2023-01-08 RX ORDER — ONDANSETRON 4 MG/1
4 TABLET, ORALLY DISINTEGRATING ORAL ONCE
Status: COMPLETED | OUTPATIENT
Start: 2023-01-08 | End: 2023-01-08

## 2023-01-08 RX ADMIN — ONDANSETRON 4 MG: 4 TABLET, ORALLY DISINTEGRATING ORAL at 14:47

## 2023-01-08 ASSESSMENT — ENCOUNTER SYMPTOMS
NAUSEA: 1
VOMITING: 0
SHORTNESS OF BREATH: 0

## 2023-01-08 ASSESSMENT — PAIN - FUNCTIONAL ASSESSMENT: PAIN_FUNCTIONAL_ASSESSMENT: NONE - DENIES PAIN

## 2023-01-08 ASSESSMENT — LIFESTYLE VARIABLES
HOW MANY STANDARD DRINKS CONTAINING ALCOHOL DO YOU HAVE ON A TYPICAL DAY: PATIENT DOES NOT DRINK
HOW OFTEN DO YOU HAVE A DRINK CONTAINING ALCOHOL: NEVER

## 2023-01-08 NOTE — ED NOTES
Mode of arrival (squad #, walk in, police, etc) : LS2        Chief complaint(s): Drug overdose        Arrival Note (brief scenario, treatment PTA, etc). : Patient brought in by [facility] after unintentional drug overdose. Patient reports he is homeless and that he is starting treatment tomorrow and wanted to get his last \"high\" today. Patient was given 6 total of Narcan 2 IN and 4 IV. Patient alert upon arrival to ED and answering all questions appropriately. C= \"Have you ever felt that you should Cut down on your drinking? \"  No  A= \"Have people Annoyed you by criticizing your drinking? \"  No  G= \"Have you ever felt bad or Guilty about your drinking? \"  No  E= \"Have you ever had a drink as an Eye-opener first thing in the morning to steady your nerves or to help a hangover? \"  No      Deferred []      Reason for deferring: N/A    *If yes to two or more: probable alcohol abuse. Redd Teixeira RN  01/08/23 1289

## 2023-01-08 NOTE — ED PROVIDER NOTES
[address] ED  EMERGENCY DEPARTMENT ENCOUNTER      Pt Name: Yony Connolly  MRN: [id number]  [place] 1985  Date of evaluation: 1/8/23      CHIEF COMPLAINT       Chief Complaint   Patient presents with    Drug Overdose     HISTORY OF PRESENT ILLNESS   HPI 44 y.o. male presents to the emergency department for evaluation after a drug overdose. History is obtained from EMS, review of the medical record, and discussion with the patient. The patient reports that he snorted what he believes to be was fentanyl earlier this afternoon. He denies any suicide attempt. He reports that he has a problem with opiate dependence. He is planning [facility]. EMS says they were called to a home for a drug overdose. The patient was found breathing but unresponsive. EMS gave 2 mg of intranasal Narcan with no response, they were able to place a peripheral IV and then they gave 4 more milligrams of intravenous Narcan. They did not have to do CPR. The patient was transferred to the emergency department without complication. After this the patient's mental status normalized. He reports that he is feeling very nauseated right now, denies any shortness of breath. He denies any chest pain. No headache. REVIEW OF SYSTEMS       Review of Systems   Constitutional:  Negative for fever. HENT:  Negative for congestion. Respiratory:  Negative for shortness of breath. Cardiovascular:  Negative for chest pain. Gastrointestinal:  Positive for nausea. Negative for vomiting. Genitourinary:  Negative for difficulty urinating. Skin:  Negative for rash. Psychiatric/Behavioral:  Negative for suicidal ideas. PAST MEDICAL HISTORY     Past Medical History:   Diagnosis Date    Asthma        SURGICAL HISTORY     History reviewed. No pertinent surgical history.     CURRENT MEDICATIONS       Discharge Medication List as of 1/8/2023  3:53 PM        CONTINUE these medications which have NOT CHANGED    Details   lidocaine 4 % external patch Place 1 patch onto the skin daily, TransDERmal, DAILY Starting Sun 8/28/2022, Disp-5 patch, R-1, Print      ondansetron (ZOFRAN-ODT) 4 MG disintegrating tablet Take 1 tablet by mouth every 8 hours as needed for Nausea or Vomiting, Disp-30 tablet, R-0Print      orphenadrine (NORFLEX) 100 MG extended release tablet Take 1 tablet by mouth 2 times daily, Disp-14 tablet, R-0Print      ibuprofen (ADVIL;MOTRIN) 800 MG tablet Take 1 tablet by mouth every 8 hours as needed for Pain, Disp-21 tablet, R-0      albuterol (PROVENTIL) (2.5 MG/3ML) 0.083% nebulizer solution Take 2.5 mg by nebulization every 6 hours as needed for Wheezing. Historical Med      ALPRAZolam (XANAX) 0.25 MG tablet Take 1 mg by mouth 3 times daily as needed for Sleep. Historical Med             ALLERGIES     is allergic to endocet [oxycodone-acetaminophen]. FAMILY HISTORY     has no family status information on file. SOCIAL HISTORY      reports that he has been smoking cigarettes. He has been smoking an average of 1 pack per day. He has quit using smokeless tobacco.  His smokeless tobacco use included chew. He reports current alcohol use. He reports current drug use. Drugs: Marijuana Garon Petty), Cocaine, and Methamphetamines (Crystal Meth).     PHYSICAL EXAM     INITIAL VITALS: /82   Pulse 80   Temp 97 °F (36.1 °C)   Resp 20   Ht 6' 2\" (1.88 m)   Wt 204 lb (92.5 kg)   SpO2 94%   BMI 26.19 kg/m²   Gen: nad  Head: Normocephalic, atraumatic  Eye: Pupils equal round reactive to light, no conjunctivitis  ENT: MMM  Neck: no adenopathy or JVD  Heart: Regular rate and rhythm no murmurs  Lungs: Clear to auscultation bilaterally, no respiratory distress  Chest wall: No crepitus, no tenderness palpation  Abdomen: Soft, nontender, nondistended, with no peritoneal signs  Skin: No erythema or induration on the arms that would suggest cellulitis, I do not see any injection sites.  Neurologic: Patient is alert and oriented x3, motor and sensation is intact in all 4 extremities, fluent speech  Extremities: no rash or edema    MEDICAL DECISION MAKING:     This is a 25-year-old gentleman that was brought in after being found unresponsive. History is obtained from the patient, EMS report, and a review of the medical record. The patient responded to Narcan suggesting an opiate overdose. The patient denies suicidal intentions or plan. On his physical examination I do not see any signs of aspiration pneumonia. The patient was placed on a cardiac monitor, he was monitored for any rebound mental status or respiratory depression. We provided him antiemetics for his nausea. The patient was monitored for approximately 1.5 hours, he was reassessed, and his mental status and respiratory rate oxygen saturations all remained appropriate. The patient was given Narcan. The patient has outpatient substance abuse treatment which he will be starting tomorrow. D/w pt treatment plan, warning precautions for prompt ED return and importance of close OP FU, he verbalizes understanding and agrees with the treatment plan. Procedures    DATA FOR LAB AND RADIOLOGY TESTS ORDERED BELOW ARE REVIEWED BY THE ED CLINICIAN:    RADIOLOGY: All x-rays, CT, MRI, and formal ultrasound images (except ED bedside ultrasound) are read by the radiologist, see reports below, unless otherwise noted in MDM or here. Reports below are reviewed by myself. No orders to display       LABS: Lab orders shown below, the results are reviewed by myself, and all abnormals are listed below.   Labs Reviewed - No data to display    Vitals Reviewed:    Vitals:    01/08/23 1419 01/08/23 1445 01/08/23 1500 01/08/23 1511   BP: (!) 154/86 132/87 128/82    Pulse: 91 79 79 80   Resp: 10 22 19 20   Temp: 97 °F (36.1 °C)      SpO2: 94% 91% 91% 94%   Weight: 204 lb (92.5 kg)      Height: 6' 2\" (1.88 m)        MEDICATIONS GIVEN TO PATIENT THIS ENCOUNTER:  Orders Placed This Encounter   Medications    ondansetron (ZOFRAN-ODT) disintegrating tablet 4 mg    NALOXONE OPIATE OVERDOSE KIT 1 each     DISCHARGE PRESCRIPTIONS:  Discharge Medication List as of 1/8/2023  3:53 PM        PHYSICIAN CONSULTS ORDERED THIS ENCOUNTER:  None     FINAL IMPRESSION      1.  Opiate overdose, accidental or unintentional, initial encounter [facility])          DISPOSITION/PLAN   DISPOSITION Decision To Discharge 01/08/2023 03:48:15 PM      PATIENT REFERRED TO:  Roc Taveras  [address] [place] [address] [address]  [address] [place] [address]  [phone]          Substance abuse treatment    In 1 day      [facility] ED  [address]  [address] [address] [phone]    If symptoms worsen    DISCHARGE MEDICATIONS:  Discharge Medication List as of 1/8/2023  3:53 PM            Sharonda Lindsey MD  Attending Emergency Physician                     Sharonda Lindsey MD  01/09/23 6712

## 2023-02-27 ENCOUNTER — HOSPITAL ENCOUNTER (EMERGENCY)
Age: 38
Discharge: HOME | End: 2023-02-27
Attending: EMERGENCY MEDICINE
Payer: COMMERCIAL

## 2023-02-27 ENCOUNTER — APPOINTMENT (OUTPATIENT)
Dept: GENERAL RADIOLOGY | Age: 38
End: 2023-02-27
Payer: COMMERCIAL

## 2023-02-27 VITALS
HEIGHT: 72 IN | HEART RATE: 62 BPM | RESPIRATION RATE: 16 BRPM | SYSTOLIC BLOOD PRESSURE: 124 MMHG | WEIGHT: 195 LBS | DIASTOLIC BLOOD PRESSURE: 81 MMHG | TEMPERATURE: 98.3 F | OXYGEN SATURATION: 99 % | BODY MASS INDEX: 26.41 KG/M2

## 2023-02-27 DIAGNOSIS — S63.054A DISLOCATION OF CARPOMETACARPAL JOINT OF RIGHT HAND, INITIAL ENCOUNTER: ICD-10-CM

## 2023-02-27 DIAGNOSIS — S62.316A CLOSED DISPLACED FRACTURE OF BASE OF FIFTH METACARPAL BONE OF RIGHT HAND, INITIAL ENCOUNTER: ICD-10-CM

## 2023-02-27 DIAGNOSIS — K08.89 PAIN, DENTAL: ICD-10-CM

## 2023-02-27 DIAGNOSIS — S62.314A CLOSED DISPLACED FRACTURE OF BASE OF FOURTH METACARPAL BONE OF RIGHT HAND, INITIAL ENCOUNTER: Primary | ICD-10-CM

## 2023-02-27 PROCEDURE — 99283 EMERGENCY DEPT VISIT LOW MDM: CPT

## 2023-02-27 PROCEDURE — 29125 APPL SHORT ARM SPLINT STATIC: CPT

## 2023-02-27 PROCEDURE — 6370000000 HC RX 637 (ALT 250 FOR IP): Performed by: PHYSICIAN ASSISTANT

## 2023-02-27 PROCEDURE — 73130 X-RAY EXAM OF HAND: CPT

## 2023-02-27 PROCEDURE — 73110 X-RAY EXAM OF WRIST: CPT

## 2023-02-27 RX ORDER — HYDROCODONE BITARTRATE AND ACETAMINOPHEN 5; 325 MG/1; MG/1
1 TABLET ORAL ONCE
Status: COMPLETED | OUTPATIENT
Start: 2023-02-27 | End: 2023-02-27

## 2023-02-27 RX ORDER — NAPROXEN 500 MG/1
500 TABLET ORAL 2 TIMES DAILY WITH MEALS
Qty: 30 TABLET | Refills: 0 | Status: SHIPPED | OUTPATIENT
Start: 2023-02-27 | End: 2023-02-27 | Stop reason: SDUPTHER

## 2023-02-27 RX ORDER — PENICILLIN V POTASSIUM 500 MG/1
500 TABLET ORAL 4 TIMES DAILY
Qty: 40 TABLET | Refills: 0 | Status: SHIPPED | OUTPATIENT
Start: 2023-02-27 | End: 2023-02-27 | Stop reason: SDUPTHER

## 2023-02-27 RX ORDER — NAPROXEN 500 MG/1
500 TABLET ORAL 2 TIMES DAILY WITH MEALS
Qty: 30 TABLET | Refills: 0 | Status: SHIPPED | OUTPATIENT
Start: 2023-02-27

## 2023-02-27 RX ORDER — ACETAMINOPHEN 325 MG/1
650 TABLET ORAL EVERY 6 HOURS PRN
Qty: 30 TABLET | Refills: 0 | Status: SHIPPED | OUTPATIENT
Start: 2023-02-27

## 2023-02-27 RX ORDER — PENICILLIN V POTASSIUM 250 MG/1
500 TABLET ORAL ONCE
Status: COMPLETED | OUTPATIENT
Start: 2023-02-27 | End: 2023-02-27

## 2023-02-27 RX ORDER — PENICILLIN V POTASSIUM 500 MG/1
500 TABLET ORAL 4 TIMES DAILY
Qty: 40 TABLET | Refills: 0 | Status: SHIPPED | OUTPATIENT
Start: 2023-02-27 | End: 2023-03-09

## 2023-02-27 RX ORDER — ACETAMINOPHEN 325 MG/1
650 TABLET ORAL EVERY 6 HOURS PRN
Qty: 30 TABLET | Refills: 0 | Status: SHIPPED | OUTPATIENT
Start: 2023-02-27 | End: 2023-02-27 | Stop reason: SDUPTHER

## 2023-02-27 RX ADMIN — HYDROCODONE BITARTRATE AND ACETAMINOPHEN 1 TABLET: 5; 325 TABLET ORAL at 14:03

## 2023-02-27 RX ADMIN — PENICILLIN V POTASSIUM 500 MG: 250 TABLET, FILM COATED ORAL at 14:04

## 2023-02-27 ASSESSMENT — PAIN - FUNCTIONAL ASSESSMENT
PAIN_FUNCTIONAL_ASSESSMENT: 0-10
PAIN_FUNCTIONAL_ASSESSMENT: 0-10

## 2023-02-27 ASSESSMENT — PAIN DESCRIPTION - DESCRIPTORS: DESCRIPTORS: STABBING;SHARP

## 2023-02-27 ASSESSMENT — PAIN SCALES - GENERAL
PAINLEVEL_OUTOF10: 6
PAINLEVEL_OUTOF10: 6
PAINLEVEL_OUTOF10: 10
PAINLEVEL_OUTOF10: 10

## 2023-02-27 ASSESSMENT — PAIN DESCRIPTION - ORIENTATION
ORIENTATION: LEFT

## 2023-02-27 ASSESSMENT — LIFESTYLE VARIABLES
HOW OFTEN DO YOU HAVE A DRINK CONTAINING ALCOHOL: 2-4 TIMES A MONTH
HOW MANY STANDARD DRINKS CONTAINING ALCOHOL DO YOU HAVE ON A TYPICAL DAY: 3 OR 4

## 2023-02-27 ASSESSMENT — PAIN DESCRIPTION - LOCATION
LOCATION: JAW
LOCATION: JAW

## 2023-02-27 ASSESSMENT — PAIN DESCRIPTION - FREQUENCY: FREQUENCY: CONTINUOUS

## 2023-02-27 ASSESSMENT — PAIN DESCRIPTION - PAIN TYPE: TYPE: ACUTE PAIN

## 2023-02-27 NOTE — DISCHARGE INSTRUCTIONS
Please follow up with Dr. Membreno [facility] - hand surgeon. I also recommend Follow up with dentist.  Return if you develop worsening pain, swelling, shortness of breath, fevers.

## 2023-02-27 NOTE — ED PROVIDER NOTES
eMERGENCY dEPARTMENT eNCOUnter   Independent Attestation     Pt Name: Aguila Browne  MRN: [id number]  [place] 1985  Date of evaluation: 2/27/23      Based on the medical record the care appears appropriate. I was personally available for consultation in the Emergency Department.     Ashwin Jurado MD  Attending Emergency Physician                  Ashwni Jurado MD  02/27/23 1400

## 2023-02-27 NOTE — ED PROVIDER NOTES
[address]  eMERGENCY dEPARTMENT eNCOUnter      Pt Name: Fiordaliza Escudero  MRN: [id number]  [place] 1985  Date of evaluation: 2/27/23      CHIEF COMPLAINT:   Chief Complaint   Patient presents with    Hand Injury    Dental Pain     HISTORY OF PRESENT ILLNESS    Fiordaliza Escudero is a 44 y.o. male who presents with complaints of left upper dental pain and right hand pain. Dental pain began yesterday. Pt denies injury. Reports problem with his wisdom tooth. Pain is shooting into jaw and left side of head. He has tried motrin with no relief. Does have dentist apt in 2 weeks. Denies n/v/f/c/abd pain/CP/SOB. He did not drive here. Right hand pain x 3 weeks. Pt punched a door. States he has not had it evaluated. Reports pain and swelling over the back of the hand. Pain over side of wrist.  He denies numbness. He is right handed. No other complaints. REVIEW OF SYSTEMS     Hand pain  Wrist pain   Dental pain   Swelling      Negative in 10 essential Systems except as mentioned above and in the HPI. PAST MEDICAL HISTORY   PMH:  has a past medical history of Asthma. none otherwise stated in nurses notes  Surgical History:  has no past surgical history on file. none otherwise stated in nurses notes  Social History:  reports that he has been smoking cigarettes. He has been smoking an average of 1 pack per day. He has quit using smokeless tobacco.  His smokeless tobacco use included chew. He reports current alcohol use. He reports current drug use. Drugs: Marijuana Lorretta Freud), Cocaine, and Methamphetamines (Crystal Meth). none otherwise stated in nurses notes  Family History: none otherwise stated in nurses notes  Psychiatric History: none otherwise stated in nurses notes    Allergies:is allergic to endocet [oxycodone-acetaminophen].       PHYSICAL EXAM     INITIAL VITALS: BP (!) 136/95   Pulse 73   Temp 98.3 °F (36.8 °C) (Oral)   Resp 20   Ht 6' (1.829 m)   Wt 195 lb (88.5 kg)   SpO2 99% BMI 26.45 kg/m²   CONSTITUTIONAL: Vital signs reviewed, Alert and oriented X 3. HEAD: Atraumatic, Normocephalic. EYES: Eyes are normal to inspection, Pupils equal, round and reactive to light. NECK: Normal ROM, No jugular venous distention, No meningeal signs, Cervical spine nontender. MOUTH:  + dental pain at 15, 16, dental decay noted with no signs of abscess formation or Candido sign noted. No swelling involving the airway at all. No trismus. Lips are normal.  No tongue elevation. No tenderness over floor of mouth. Controlling secretions. Speaking in full sentences. RESPIRATORY CHEST: No respiratory distress. ABDOMEN: Abdomen is nontender, No distension. UPPER EXTREMITY: Examination of right hand reveals tenderness, deformity and swelling over the 3rd-5th metatarsal. Tenderness over ulnar side of wrist.  FROM. 5/5 strength. Good  strength. Brisk cap refill. Distal sensation intact. 2/2 radial pulse. NEURO: GCS is 15, Speech normal, Memory normal.   SKIN: Skin is warm, Skin is dry. PSYCHIATRIC: Oriented X 3, Normal affect. EMERGENCY DEPARTMENT COURSE:   1) left upper dental pain. Tooth 15-16. Decay noted. No abscess. Dentist apt in 2 weeks. Will start on pcn. Pt had recent drug overdose. 1 dose of norco given in ED. Naproxen, tylenol sent to pharmacy. 2) right hand injury 3 weeks ago. Xray is showing carpometacarpal fracture, dislocation at 4th, 5th. Will place in sugar tong. Referred to dr. Sruthi Hussein. After application of sugar tong splint to right arm by RN, Post application exam shows:  cap refill less than 2 seconds  there is no swelling or discoloration  Sensation intact and able to move distally  Splint is appropriate      Discussed results and plan with the pt. They expressed appropriate understanding. Pt given close follow up, supportive care instructions and strict return instructions at the bedside. Pain meds and antibiotic prescriptions.   Pt provided with dental clinic list and instructed to call as soon as possible for an appointment. Instructed to return for worsening or any new symptoms including throat swelling, difficulty swallowing or breathing. Pt agrees. The care is provided during an unprecedented national emergency due to the novel coronavirus, COVID-19. FINAL IMPRESSION:     1. Closed displaced fracture of base of fourth metacarpal bone of right hand, initial encounter    2. Closed displaced fracture of base of fifth metacarpal bone of right hand, initial encounter    3. Dislocation of carpometacarpal joint of right hand, initial encounter    4. Pain, dental          DISPOSITION:  DISPOSITION Decision To Discharge 02/27/2023 02:55:09 PM        PATIENT REFERRED TO:  [facility] SPECIAL[facility] SURG[facility]  [facility] [address]  [address] [facility] [address] [phone]  Call       [facility] ED  [facility] [address]  Adelina Becker [address]  [facility] [address] [phone]    Call       DISCHARGE MEDICATIONS:  New Prescriptions    ACETAMINOPHEN (AMINOFEN) 325 MG TABLET    Take 2 tablets by mouth every 6 hours as needed for Pain    NAPROXEN (NAPROSYN) 500 MG TABLET    Take 1 tablet by mouth 2 times daily (with meals)    PENICILLIN V POTASSIUM (VEETID) 500 MG TABLET    Take 1 tablet by mouth 4 times daily for 10 days       (Please note that portions of this note were completed with a voice recognition program.  Efforts were made to edit the dictations but occasionally words are mis-transcribed.)    MANOJ Fowler PA-C  02/27/23 2213

## 2023-10-04 ENCOUNTER — OFFICE VISIT (OUTPATIENT)
Dept: ORTHOPEDIC SURGERY | Age: 38
End: 2023-10-04

## 2023-10-04 VITALS — WEIGHT: 195 LBS | BODY MASS INDEX: 26.41 KG/M2 | HEIGHT: 72 IN

## 2023-10-04 DIAGNOSIS — R52 PAIN: Primary | ICD-10-CM

## 2023-10-04 NOTE — PROGRESS NOTES
[address] [facility]  [address] [facility] [address]  Dept: [phone]    Ambulatory Orthopedic Consult    CHIEF COMPLAINT:    Chief Complaint   Patient presents with    New Patient     Right hand pain / injury in 01/2023       HISTORY OF PRESENT ILLNESS:      The patient is a 43 y.o. RHD male who is being seen for consultation and evaluation of right hand 4th, 5th metacarpal fracture in January 2023 from punching a wall. He was seen 3 weeks after the injury, placed into a splint that he then removed after half a day. He was to follow up with Dr. Cynthia Stokes but was unable to due to scheduling issues. 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 Patient has been staying at a rehabilitation center for alcohol abuse. He continues to chew nicotine gum daily. Denies further trauma to the right hand. States the thing that bothers him the most is the cosmetic deformity to the dorsum of the hand at the base of the metacarpals. Denies numbness, tingling in the right hand or upper extremity. No other pertinent orthopedic or medical history. Past Medical History:    Past Medical History:   Diagnosis Date    Asthma        Past Surgical History:    History reviewed. No pertinent surgical history.     Current Medications:   Current Outpatient Medications   Medication Sig Dispense Refill    acetaminophen (AMINOFEN) 325 MG tablet Take 2 tablets by mouth every 6 hours as needed for Pain 30 tablet 0    naproxen (NAPROSYN) 500 MG tablet Take 1 tablet by mouth 2 times daily (with meals) 30 tablet 0    lidocaine 4 % external patch Place 1 patch onto the skin daily 5 patch 1    ondansetron (ZOFRAN-ODT) 4 MG disintegrating tablet Take 1 tablet by mouth every 8 hours as needed for Nausea or Vomiting 30 tablet 0    orphenadrine (NORFLEX) 100 MG extended release tablet Take 1 tablet by

## 2025-07-05 ENCOUNTER — HOSPITAL ENCOUNTER (INPATIENT)
Age: 40
LOS: 4 days | Discharge: HOME | DRG: 751 | End: 2025-07-09
Attending: EMERGENCY MEDICINE | Admitting: INTERNAL MEDICINE
Payer: COMMERCIAL

## 2025-07-05 DIAGNOSIS — R45.851 SUICIDAL IDEATION: Primary | ICD-10-CM

## 2025-07-05 PROBLEM — F32.A DEPRESSION WITH SUICIDAL IDEATION: Status: ACTIVE | Noted: 2025-07-05

## 2025-07-05 LAB
AMPHET UR QL SCN: NEGATIVE
ANION GAP SERPL CALCULATED.3IONS-SCNC: 13 MMOL/L (ref 9–16)
BACTERIA URNS QL MICRO: ABNORMAL
BARBITURATES UR QL SCN: NEGATIVE
BASOPHILS # BLD: 0.07 K/UL (ref 0–0.2)
BASOPHILS NFR BLD: 1 % (ref 0–2)
BENZODIAZ UR QL: NEGATIVE
BILIRUB UR QL STRIP: NEGATIVE
BUN SERPL-MCNC: 12 MG/DL (ref 6–20)
CALCIUM SERPL-MCNC: 9 MG/DL (ref 8.6–10.4)
CANNABINOIDS UR QL SCN: NEGATIVE
CASTS #/AREA URNS LPF: ABNORMAL /LPF
CHLORIDE SERPL-SCNC: 102 MMOL/L (ref 98–107)
CLARITY UR: ABNORMAL
CO2 SERPL-SCNC: 23 MMOL/L (ref 20–31)
COCAINE UR QL SCN: POSITIVE
COLOR UR: YELLOW
CREAT SERPL-MCNC: 0.9 MG/DL (ref 0.7–1.2)
EOSINOPHIL # BLD: 0.24 K/UL (ref 0–0.44)
EOSINOPHILS RELATIVE PERCENT: 4 % (ref 0–4)
EPI CELLS #/AREA URNS HPF: ABNORMAL /HPF
ERYTHROCYTE [DISTWIDTH] IN BLOOD BY AUTOMATED COUNT: 13.7 % (ref 11.5–14.9)
ETHANOL PERCENT: 0.02 %
ETHANOLAMINE SERPL-MCNC: 17 MG/DL (ref 0–0.08)
FENTANYL UR QL: NEGATIVE
GFR, ESTIMATED: >90 ML/MIN/1.73M2
GLUCOSE SERPL-MCNC: 134 MG/DL (ref 74–99)
GLUCOSE UR STRIP-MCNC: NEGATIVE MG/DL
HCT VFR BLD AUTO: 47.7 % (ref 41–53)
HGB BLD-MCNC: 15.9 G/DL (ref 13.5–17.5)
HGB UR QL STRIP.AUTO: NEGATIVE
IMM GRANULOCYTES # BLD AUTO: <0.03 K/UL (ref 0–0.3)
IMM GRANULOCYTES NFR BLD: 0 %
KETONES UR STRIP-MCNC: NEGATIVE MG/DL
LEUKOCYTE ESTERASE UR QL STRIP: NEGATIVE
LYMPHOCYTES NFR BLD: 1.41 K/UL (ref 1.1–3.7)
LYMPHOCYTES RELATIVE PERCENT: 21 % (ref 24–44)
MCH RBC QN AUTO: 30.9 PG (ref 26–34)
MCHC RBC AUTO-ENTMCNC: 33.3 G/DL (ref 31–37)
MCV RBC AUTO: 92.6 FL (ref 80–100)
METHADONE UR QL: NEGATIVE
MONOCYTES NFR BLD: 0.69 K/UL (ref 0.1–1.2)
MONOCYTES NFR BLD: 10 % (ref 3–12)
NEUTROPHILS NFR BLD: 64 % (ref 36–66)
NEUTS SEG NFR BLD: 4.44 K/UL (ref 1.5–8.1)
NITRITE UR QL STRIP: NEGATIVE
NRBC BLD-RTO: 0 PER 100 WBC
OPIATES UR QL SCN: NEGATIVE
OXYCODONE UR QL SCN: NEGATIVE
PCP UR QL SCN: NEGATIVE
PH UR STRIP: 7.5 [PH] (ref 5–8)
PLATELET # BLD AUTO: 307 K/UL (ref 150–450)
PMV BLD AUTO: 8.7 FL (ref 8–13.5)
POTASSIUM SERPL-SCNC: 3.6 MMOL/L (ref 3.7–5.3)
PROT UR STRIP-MCNC: ABNORMAL MG/DL
RBC # BLD AUTO: 5.15 M/UL (ref 4.21–5.77)
RBC #/AREA URNS HPF: ABNORMAL /HPF
SODIUM SERPL-SCNC: 138 MMOL/L (ref 136–145)
SP GR UR STRIP: 1.02 (ref 1–1.03)
TEST INFORMATION: ABNORMAL
UROBILINOGEN UR STRIP-ACNC: NORMAL EU/DL (ref 0–1)
WBC #/AREA URNS HPF: ABNORMAL /HPF
WBC OTHER # BLD: 6.9 K/UL (ref 3.5–11)

## 2025-07-05 PROCEDURE — 85025 COMPLETE CBC W/AUTO DIFF WBC: CPT

## 2025-07-05 PROCEDURE — 6370000000 HC RX 637 (ALT 250 FOR IP)

## 2025-07-05 PROCEDURE — 99222 1ST HOSP IP/OBS MODERATE 55: CPT | Performed by: INTERNAL MEDICINE

## 2025-07-05 PROCEDURE — 6370000000 HC RX 637 (ALT 250 FOR IP): Performed by: PSYCHIATRY & NEUROLOGY

## 2025-07-05 PROCEDURE — G0480 DRUG TEST DEF 1-7 CLASSES: HCPCS

## 2025-07-05 PROCEDURE — 6370000000 HC RX 637 (ALT 250 FOR IP): Performed by: INTERNAL MEDICINE

## 2025-07-05 PROCEDURE — 81001 URINALYSIS AUTO W/SCOPE: CPT

## 2025-07-05 PROCEDURE — GZHZZZZ GROUP PSYCHOTHERAPY: ICD-10-PCS | Performed by: PSYCHIATRY & NEUROLOGY

## 2025-07-05 PROCEDURE — 36415 COLL VENOUS BLD VENIPUNCTURE: CPT

## 2025-07-05 PROCEDURE — 80048 BASIC METABOLIC PNL TOTAL CA: CPT

## 2025-07-05 PROCEDURE — 1240000000 HC EMOTIONAL WELLNESS R&B

## 2025-07-05 PROCEDURE — 80307 DRUG TEST PRSMV CHEM ANLYZR: CPT

## 2025-07-05 PROCEDURE — 99285 EMERGENCY DEPT VISIT HI MDM: CPT

## 2025-07-05 RX ORDER — IBUPROFEN 400 MG/1
400 TABLET, FILM COATED ORAL EVERY 6 HOURS PRN
Status: DISCONTINUED | OUTPATIENT
Start: 2025-07-05 | End: 2025-07-09 | Stop reason: HOSPADM

## 2025-07-05 RX ORDER — HYDROXYZINE HYDROCHLORIDE 50 MG/1
50 TABLET, FILM COATED ORAL 3 TIMES DAILY PRN
Status: DISCONTINUED | OUTPATIENT
Start: 2025-07-05 | End: 2025-07-09 | Stop reason: HOSPADM

## 2025-07-05 RX ORDER — TRAZODONE HYDROCHLORIDE 50 MG/1
50 TABLET ORAL NIGHTLY PRN
Status: DISCONTINUED | OUTPATIENT
Start: 2025-07-05 | End: 2025-07-09 | Stop reason: HOSPADM

## 2025-07-05 RX ORDER — POLYETHYLENE GLYCOL 3350 17 G/17G
17 POWDER, FOR SOLUTION ORAL DAILY PRN
Status: DISCONTINUED | OUTPATIENT
Start: 2025-07-05 | End: 2025-07-09 | Stop reason: HOSPADM

## 2025-07-05 RX ORDER — POLYETHYLENE GLYCOL 3350 17 G
2 POWDER IN PACKET (EA) ORAL
Status: DISCONTINUED | OUTPATIENT
Start: 2025-07-05 | End: 2025-07-09 | Stop reason: HOSPADM

## 2025-07-05 RX ORDER — SERTRALINE HYDROCHLORIDE 25 MG/1
25 TABLET, FILM COATED ORAL DAILY
Status: DISCONTINUED | OUTPATIENT
Start: 2025-07-05 | End: 2025-07-07

## 2025-07-05 RX ORDER — POTASSIUM CHLORIDE 1500 MG/1
40 TABLET, EXTENDED RELEASE ORAL ONCE
Status: COMPLETED | OUTPATIENT
Start: 2025-07-05 | End: 2025-07-05

## 2025-07-05 RX ORDER — MAGNESIUM HYDROXIDE/ALUMINUM HYDROXICE/SIMETHICONE 120; 1200; 1200 MG/30ML; MG/30ML; MG/30ML
30 SUSPENSION ORAL EVERY 6 HOURS PRN
Status: DISCONTINUED | OUTPATIENT
Start: 2025-07-05 | End: 2025-07-09 | Stop reason: HOSPADM

## 2025-07-05 RX ADMIN — POTASSIUM CHLORIDE 40 MEQ: 1500 TABLET, EXTENDED RELEASE ORAL at 13:32

## 2025-07-05 RX ADMIN — NICOTINE POLACRILEX 2 MG: 2 LOZENGE ORAL at 21:14

## 2025-07-05 RX ADMIN — TRAZODONE HYDROCHLORIDE 50 MG: 50 TABLET ORAL at 21:14

## 2025-07-05 RX ADMIN — SERTRALINE HYDROCHLORIDE 25 MG: 25 TABLET ORAL at 12:40

## 2025-07-05 RX ADMIN — HYDROXYZINE HYDROCHLORIDE 50 MG: 50 TABLET ORAL at 21:14

## 2025-07-05 ASSESSMENT — PATIENT HEALTH QUESTIONNAIRE - PHQ9
9. THOUGHTS THAT YOU WOULD BE BETTER OFF DEAD, OR OF HURTING YOURSELF: MORE THAN HALF THE DAYS
SUM OF ALL RESPONSES TO PHQ QUESTIONS 1-9: 16
8. MOVING OR SPEAKING SO SLOWLY THAT OTHER PEOPLE COULD HAVE NOTICED. OR THE OPPOSITE, BEING SO FIGETY OR RESTLESS THAT YOU HAVE BEEN MOVING AROUND A LOT MORE THAN USUAL: MORE THAN HALF THE DAYS
6. FEELING BAD ABOUT YOURSELF - OR THAT YOU ARE A FAILURE OR HAVE LET YOURSELF OR YOUR FAMILY DOWN: MORE THAN HALF THE DAYS
7. TROUBLE CONCENTRATING ON THINGS, SUCH AS READING THE NEWSPAPER OR WATCHING TELEVISION: MORE THAN HALF THE DAYS
4. FEELING TIRED OR HAVING LITTLE ENERGY: MORE THAN HALF THE DAYS
5. POOR APPETITE OR OVEREATING: MORE THAN HALF THE DAYS
SUM OF ALL RESPONSES TO PHQ QUESTIONS 1-9: 18
1. LITTLE INTEREST OR PLEASURE IN DOING THINGS: MORE THAN HALF THE DAYS
SUM OF ALL RESPONSES TO PHQ QUESTIONS 1-9: 18
2. FEELING DOWN, DEPRESSED OR HOPELESS: MORE THAN HALF THE DAYS
3. TROUBLE FALLING OR STAYING ASLEEP: MORE THAN HALF THE DAYS
SUM OF ALL RESPONSES TO PHQ QUESTIONS 1-9: 18
10. IF YOU CHECKED OFF ANY PROBLEMS, HOW DIFFICULT HAVE THESE PROBLEMS MADE IT FOR YOU TO DO YOUR WORK, TAKE CARE OF THINGS AT HOME, OR GET ALONG WITH OTHER PEOPLE: VERY DIFFICULT

## 2025-07-05 ASSESSMENT — SLEEP AND FATIGUE QUESTIONNAIRES
AVERAGE NUMBER OF SLEEP HOURS: 2
DO YOU USE A SLEEP AID: NO
SLEEP PATTERN: INSOMNIA
DO YOU HAVE DIFFICULTY SLEEPING: YES

## 2025-07-05 ASSESSMENT — LIFESTYLE VARIABLES
HOW MANY STANDARD DRINKS CONTAINING ALCOHOL DO YOU HAVE ON A TYPICAL DAY: 3 OR 4
HOW MANY STANDARD DRINKS CONTAINING ALCOHOL DO YOU HAVE ON A TYPICAL DAY: 7 TO 9
HOW OFTEN DO YOU HAVE A DRINK CONTAINING ALCOHOL: 4 OR MORE TIMES A WEEK
HOW OFTEN DO YOU HAVE A DRINK CONTAINING ALCOHOL: 4 OR MORE TIMES A WEEK

## 2025-07-05 ASSESSMENT — PAIN - FUNCTIONAL ASSESSMENT
PAIN_FUNCTIONAL_ASSESSMENT: NONE - DENIES PAIN
PAIN_FUNCTIONAL_ASSESSMENT: NONE - DENIES PAIN

## 2025-07-05 NOTE — GROUP NOTE
Group Therapy Note    Date: 7/5/2025    Group Start Time: 0800  Group End Time: 0805  Group Topic: Nursing    [facility] Adult    Veronika Jonas; Marcy Fair, RN    Group Therapy Note     Date: 7/5/2025       Group Start Time: 0800  Group End Time: 0805  Group Topic: Orientation Group     [facility]           Group Therapy Note     Attendees: 9/15     Patient attended morning orientation group and actively listened while educated on unit policies, expectations, and orientation. Patient was reminded of group times, location of white board, today's staff, shower location/time, group time limitations at nurses station, phone and tv times. Patient educated on discharge process and planning as well as informed that all patient's are seen by a MD or NP every day.            Signature: Marcy COLINDRES RN & Veronika Arias

## 2025-07-05 NOTE — BH NOTE
Patient informed of needed urine specimen for ordered UA. Patient verbalized understanding.     Specimen cup placed in patients bathroom, stated he would let staff know when he voided.

## 2025-07-05 NOTE — PLAN OF CARE
Problem: Self Harm/Suicidality  Goal: Will have no self-injury during hospital stay  Description: INTERVENTIONS:  1.  Ensure constant observer at bedside with Q15M safety checks  2.  Maintain a safe environment  3.  Secure patient belongings  4.  Ensure family/visitors adhere to safety recommendations  5.  Ensure safety tray has been added to patient's diet order  6.  Every shift and PRN: Re-assess suicidal risk via Frequent Screener  Outcome: Progressing     Problem: Depression  Goal: Will be euthymic at discharge  Description: INTERVENTIONS:  1. Administer medication as ordered  2. Provide emotional support via 1:1 interaction with staff  3. Encourage involvement in milieu/groups/activities  4. Monitor for social isolation  Outcome: Progressing     Patient denied suicidal ideations, however reports feeling very hopeless and depressed related to him being homeless and his father passing away while he was in snf. He has spent most of his day sleeping, because he reports poor sleep on the street. Very pleasant, eating his meals. Shower supplies offered and hygiene care encouraged. Encouraged to attend group activities.

## 2025-07-05 NOTE — BH NOTE
Patient given tobacco quitline number 53321581769 at this time, refusing to call at this time, states \" I just dont want to quit now\"- patient given information as to the dangers of long term tobacco use. Continue to reinforce the importance of tobacco cessation.

## 2025-07-05 NOTE — BH NOTE
[facility]  Initial Interdisciplinary Treatment Plan NO      Original treatment plan Date & Time: 7/5/2025   0831    Admission Type:       Reason for admission:        Estimated Length of Stay:  5-7days  Estimated Discharge Date: to be determined by physician    PATIENT STRENGTHS:  Patient Strengths:   Patient Strengths and Limitations:   Addictive Behavior:    Medical Problems:  Past Medical History:   Diagnosis Date    Asthma      Status EXAM:Mental Status and Behavioral Exam  Normal: No  Level of Assistance: Independent/Self  Facial Expression: Worried, Sad  Affect: Appropriate  Level of Consciousness: Alert  Frequency of Checks: 4 times per hour, close  Mood:Normal: No  Mood: Depressed, Anxious, Helpless, Worthless, low self-esteem  Motor Activity:Normal: No  Motor Activity: Unusual posture/gait  Eye Contact: Good  Observed Behavior: Friendly, Cooperative, Preoccupied  Sexual Misconduct History: Current - no  Preception: [place] to person, [place] to time, [place] to place, [place] to situation  Attention:Normal: No  Attention: Distractible  Thought Processes: Blocking  Thought Content:Normal: No  Thought Content: Preoccupations  Depression Symptoms: Change in energy level, Impaired concentration, Feelings of worthlessness, Feelings of hopelessess, Feelings of helplessness  Anxiety Symptoms: Generalized  Nova Symptoms: No problems reported or observed.  Hallucinations: None  Delusions: No  Memory:Normal: No  Memory: Poor remote, Poor recent  Insight and Judgment: No  Insight and Judgment: Poor judgment, Poor insight    EDUCATION:   Learner Progress Toward Treatment Goals: reviewed group plans and strategies for care    Method:group therapy, medication compliance, individualized assessments and care planning    Outcome: needs reinforcement    PATIENT GOALS: to be discussed with patient within 72 hours    PLAN/TREATMENT RECOMMENDATIONS:     continue group therapy , medications compliance, goal

## 2025-07-05 NOTE — GROUP NOTE
Group Therapy Note    Date: 7/5/2025    Group Start Time: 0900  Group End Time: 0915  Group Topic: Nursing    [facility] Rosina Velasquez RN; Veronika Jonas        Group Therapy Note    Attendees: 10/15       Patient's Goal:  To get some rest        Status After Intervention:  Improved    Participation Level: Active Listener    Participation Quality: Appropriate      Speech:  normal      Thought Process/Content: Linear      Affective Functioning: Congruent      Mood: depressed      Level of consciousness:  Oriented x4      Response to Learning: Progressing to goal      Endings: None Reported    Modes of Intervention: Support and Socialization      Discipline Responsible: Behavorial Health Tech      Signature:  Rosina Orona RN

## 2025-07-05 NOTE — GROUP NOTE
Nursing Group Note        Date: July 5, 2025 Start Time: 1030  End Time: 1130      Number of Participants in Group & Unit Census:  3/15    Topic: Coloring Group    Goal of Group: Use hand-eye coordination to color pictures while engaging in therapeutic talk.       Comments:     Patient did not participate in Nursing group, despite staff encouragement and explanation of benefits.  Patient remain seclusive to self.  Q15 minute safety checks maintained for patient safety and will continue to encourage patient to attend unit programming.

## 2025-07-05 NOTE — ED PROVIDER NOTES
EMERGENCY DEPARTMENT ENCOUNTER    Pt Name: Jagdish Gleason  MRN: [id number]  Birthdate 1985  Date of evaluation: 7/5/25  CHIEF COMPLAINT       Chief Complaint   Patient presents with    Suicidal     HISTORY OF PRESENT ILLNESS   40-year-old male presenting to the ER complaining of suicidal ideations with a plan to use heroin to overdose.  Patient denies being treated for depression and denies any mental health illness that has been previously diagnosed    The history is provided by the patient.   Mental Health Problem  Presenting symptoms: suicidal thoughts    Presenting symptoms: no agitation    Associated symptoms: no abdominal pain, no chest pain and no fatigue            REVIEW OF SYSTEMS     Review of Systems   Constitutional:  Negative for activity change, fatigue and fever.   HENT:  Negative for congestion, ear pain and trouble swallowing.    Eyes:  Negative for photophobia and visual disturbance.   Respiratory:  Negative for cough and shortness of breath.    Cardiovascular:  Negative for chest pain and palpitations.   Gastrointestinal:  Negative for abdominal pain, diarrhea, nausea and vomiting.   Genitourinary:  Negative for dysuria, flank pain and urgency.   Musculoskeletal:  Negative for arthralgias and myalgias.   Skin:  Negative for color change and rash.   Neurological:  Negative for dizziness and facial asymmetry.   Psychiatric/Behavioral:  Positive for suicidal ideas. Negative for agitation and behavioral problems.      PASTMEDICAL HISTORY     Past Medical History:   Diagnosis Date    Asthma      Past Problem List  Patient Active Problem List   Diagnosis Code    Acute sialoadenitis K11.21    Rib injury S29.9XXA    Trauma T14.90XA    Asthma J45.909    Polysubstance abuse ([facility]) F19.10    Accidental fentanyl overdose ([facility]) T40.411A    Cocaine abuse ([facility]) F14.10    Depression with suicidal ideation F32.A, R45.851     SURGICAL HISTORY     History reviewed. No pertinent surgical history.  CURRENT

## 2025-07-05 NOTE — ED TRIAGE NOTES
Walk in to ED voluntarily. States feeling hopeless d/t recently released from shelter father  while incarcerated, has no support system. SI with plan to OD on street drugs.

## 2025-07-05 NOTE — GROUP NOTE
Nursing Group Note        Date: July 7, 2025 Start Time: 1430  End Time: 1520      Number of Participants in Group & Unit Census:  3/15    Topic: Painting your feelings    Goal of Group:Therapeutic communication while using gross and fine motor movements.       Comments:     Patient did not participate in Nursing group, despite staff encouragement and explanation of benefits.  Patient remain seclusive to self.  Q15 minute safety checks maintained for patient safety and will continue to encourage patient to attend unit programming.

## 2025-07-05 NOTE — BH NOTE
Physician notified of best practice advisor for suicide precautions. Order discontinued and safety precautions initiated.

## 2025-07-05 NOTE — H&P
Department of Psychiatry  Attending Physician Psychiatric Assessment   Patient: Jagdish Gleason  MRN: [id number]  Reason for Admission to Psychiatric Unit:  Threat to self requiring 24 hour professional observation  A mental disorder causing major disability in social, interpersonal, occupational, and/or educational functioning that is leading to dangerous or life-threatening functioning, and that can only be addressed in an acute inpatient setting   A mental disorder that causes an inability to maintain adequate nurtrition or self-care, and family/community support cannot provide reliable, essential care, so that the patient cannont function at a less intensive level of care during evaluation and treatment   Concerns about patient's safety in the community  Past Mental Health Diagnosis: a history of  Anxiety Disorder and Alcohol and/or Drug Use Disorder  Triggering event or precipitating factor: Housing instability, Financial instability, Grief r/t loss , and Alcohol/Drug Relapse  Length of time/duration of triggering event: Weeks  Legal Status: Voluntary    CHIEF COMPLAINT:  Depression with suicidal ideation     History obtained from: Patient, electronic medical record          HISTORY OF PRESENT ILLNESS:    Jagdish Gleason is a 40 y.o. male who has a past medical history of anxiety and asthma. Patient presented to the ED for suicidal ideation with plan to overdose on heroin. Patient reported increased depression and hopelessness related to recent release from alf, poor social support, and recent death of father.     Patient was seen for initial evaluation today. He was resting in bed upon approach. He was alert and oriented x4. He was agreeable to conversation. Thought process linear and speech normal. He explains to writer that he was released from alf on May 29th after serving 18 months. He had \"nowhere to go\" upon release. He was planning to stay with his father but his father  prior to his release. He has 
  Urine Drug Screen    Collection Time: 07/05/25 12:05 PM   Result Value Ref Range    Amphetamine Screen, Ur NEGATIVE NEGATIVE    Barbiturate Screen, Ur NEGATIVE NEGATIVE    Benzodiazepine Screen, Urine NEGATIVE NEGATIVE    Cocaine Metabolite, Urine POSITIVE (A) NEGATIVE    Methadone Screen, Urine NEGATIVE NEGATIVE    Opiates, Urine NEGATIVE NEGATIVE    Phencyclidine, Urine NEGATIVE NEGATIVE    Cannabinoid Scrn, Ur NEGATIVE NEGATIVE    Oxycodone Screen, Ur NEGATIVE NEGATIVE    Fentanyl, Ur NEGATIVE NEGATIVE    Test Information       This method is a screening test to detect only these drug classes as part of a medical workup. Confirmatory testing by another method should be ordered if clinically indicated.   Microscopic Urinalysis    Collection Time: 07/05/25 12:05 PM   Result Value Ref Range    WBC, UA 0 TO 2 (A) 0 TO 5 /HPF    RBC, UA 0 TO 2 0 TO 2 /HPF    Casts UA 0 TO 2 (A) None /LPF    Epithelial Cells, UA 0 TO 2 /HPF    Bacteria, UA None None       Imaging/Diagonstics:  No results found.    Assessment :      Hospital Problems           Last Modified POA    * (Principal) Depression with suicidal ideation 7/5/2025 Yes       Plan:     Admitted to inpatient psych with patient suicidal ideation  Hypokalemia replace potassium  Cocaine abuse strongly recommended to quit  Alcohol abuse watch for withdrawal  Intermittently hypertensive likely secondary lung anxiety continue to monitor  Labs and medications reviewed.     DVT prophylaxis,  Pt mobile   Full code status       Consultations:   IP CONSULT TO INTERNAL MEDICINE      Billie Loera MD  7/5/2025  1:00 PM    Copy sent to Dr. Ward primary care provider on file.    Please note that this chart was generated using voice recognition Dragon dictation software.  Although every effort was made to ensure the accuracy of this automated transcription, some errors in transcription may have occurred.

## 2025-07-05 NOTE — ED TRIAGE NOTES
Patient's personal belongings secured and patient changed into green       Safeguard informed of 1:1 watch and that they must be able to view patient's face at all time and may not leave at any time, under any circumstances. Safeguard instructed to call on radio or yell for help if patient attempts to leave or harm self/others.gown by [facility] .

## 2025-07-06 PROCEDURE — 99232 SBSQ HOSP IP/OBS MODERATE 35: CPT | Performed by: INTERNAL MEDICINE

## 2025-07-06 PROCEDURE — 1240000000 HC EMOTIONAL WELLNESS R&B

## 2025-07-06 PROCEDURE — 6370000000 HC RX 637 (ALT 250 FOR IP)

## 2025-07-06 RX ADMIN — SERTRALINE HYDROCHLORIDE 25 MG: 25 TABLET ORAL at 09:54

## 2025-07-06 ASSESSMENT — LIFESTYLE VARIABLES
HOW MANY STANDARD DRINKS CONTAINING ALCOHOL DO YOU HAVE ON A TYPICAL DAY: 3 OR 4
HOW OFTEN DO YOU HAVE A DRINK CONTAINING ALCOHOL: 2-4 TIMES A MONTH
HOW OFTEN DO YOU HAVE A DRINK CONTAINING ALCOHOL: 2-4 TIMES A MONTH

## 2025-07-06 NOTE — BH NOTE
[facility]  Admission Note     Admission Type:   Voluntary     Reason for admission:  Reason for Admission: Patient admitted from [facility] for suicidal ideations to overdose on street drugs. His father passed away while he was incarcerated - jailed for [employer] theft (served for 18 months and was released on May 29th). Is now homeless and has no support systems, signed paperwork. 7/5:High BP in AM - rechecked normal, patient stated that we plans to sleep all day, reports depression related to fathers passing and feelings of hopelessness, worried and stressed w/ housing situation, UA completed - + for cocaine, N.O. Zoloft, K+ replacement of 40 mEq given for K+ of 3.6      Addictive Behavior:   Addictive Behavior  In the Past 3 Months, Have You Felt or Has Someone Told You That You Have a Problem With  : None    Medical Problems:   Past Medical History:   Diagnosis Date    Asthma        Status EXAM:  Mental Status and Behavioral Exam  Normal: No  Level of Assistance: Independent/Self  Facial Expression: Worried, Sad  Affect: Blunt  Level of Consciousness: Alert  Frequency of Checks: 4 times per hour, close  Mood:Normal: No  Mood: Depressed, Anxious, Helpless  Motor Activity:Normal: No  Motor Activity: Unusual posture/gait  Eye Contact: Good  Observed Behavior: Friendly, Cooperative, Withdrawn, Preoccupied  Sexual Misconduct History: Current - no  Preception: [place] to person, [place] to time, [place] to place, [place] to situation  Attention:Normal: No  Attention: Distractible  Thought Processes: Blocking  Thought Content:Normal: No  Thought Content: Preoccupations  Depression Symptoms: Feelings of helplessness, Feelings of hopelessess, Feelings of worthlessness, Impaired concentration  Anxiety Symptoms: Generalized  Nova Symptoms: No problems reported or observed.  Hallucinations: None  Delusions: No  Memory:Normal: No  Memory: Poor recent, Poor remote  Insight and Judgment: No  Insight and Judgment: Poor

## 2025-07-06 NOTE — PLAN OF CARE
Problem: Depression  Goal: Will be euthymic at discharge  Description: INTERVENTIONS:  1. Administer medication as ordered  2. Provide emotional support via 1:1 interaction with staff  3. Encourage involvement in milieu/groups/activities  4. Monitor for social isolation  7/6/2025 1441 by Otf Conte, RN  Outcome: Not Progressing  7/6/2025 0056 by Imelda Ramírez, RN  Outcome: Not Progressing   NOTE: 1:1 with patient for ten minutes.  Patient encouraged to attend unit programming and interact with peers and staff.  Patient also encouraged to tend to hygiene and ADLs.  Patient encouraged to discuss feelings with staff and feedback and reassurance provided.  Patient denies thoughts of self-harm and is agreeable to seeking staff out should thoughts of self-harm arise.  Safe environment maintained.  Every 15-minute checks for safety continues per unit policy.  Will continue to monitor for safety and provide support and reassurance as needed.  Patient is controlled in behaviors. Patient is compliant with medications.

## 2025-07-06 NOTE — PLAN OF CARE
Problem: Depression  Goal: Will be euthymic at discharge  Description: INTERVENTIONS:  1. Administer medication as ordered  2. Provide emotional support via 1:1 interaction with staff  3. Encourage involvement in milieu/groups/activities  4. Monitor for social isolation  7/6/2025 0056 by mIelda Ramírez, RN  Outcome: Not Progressing  Patient stated that he is very depressed but wants to change his life for his father who passed and his children. Patient has been isolative to room during shift. Patient educated on being compliant with medication and to adhere to all follow up appointments after discharge. Will continue to monitor.       Problem: Self Harm/Suicidality  Goal: Will have no self-injury during hospital stay  Description: INTERVENTIONS:  1.  Ensure constant observer at bedside with Q15M safety checks  2.  Maintain a safe environment  3.  Secure patient belongings  4.  Ensure family/visitors adhere to safety recommendations  5.  Ensure safety tray has been added to patient's diet order  6.  Every shift and PRN: Re-assess suicidal risk via Frequent Screener    7/6/2025 0056 by Imelda Ramírez, RN  Outcome: Progressing  Flowsheets (Taken 7/6/2025 0053)  Will have no self-injury during hospital stay:   Ensure constant observer at bedside with Q15M safety checks   Maintain a safe environment   Secure patient belongings    Patient has not had any self harm attempts during stay at hospital. Patient has been compliant with staff and hospital rules since admission. Patient will have continued 15 minute safety rounds during stay at hospital. Will continue to monitor.

## 2025-07-07 PROCEDURE — 1240000000 HC EMOTIONAL WELLNESS R&B

## 2025-07-07 PROCEDURE — 6370000000 HC RX 637 (ALT 250 FOR IP): Performed by: PSYCHIATRY & NEUROLOGY

## 2025-07-07 PROCEDURE — 99232 SBSQ HOSP IP/OBS MODERATE 35: CPT | Performed by: PSYCHIATRY & NEUROLOGY

## 2025-07-07 PROCEDURE — APPSS30 APP SPLIT SHARED TIME 16-30 MINUTES

## 2025-07-07 PROCEDURE — 6370000000 HC RX 637 (ALT 250 FOR IP)

## 2025-07-07 RX ORDER — DIPHENHYDRAMINE HYDROCHLORIDE, ZINC ACETATE 2; .1 G/100G; G/100G
CREAM TOPICAL 3 TIMES DAILY PRN
Status: DISCONTINUED | OUTPATIENT
Start: 2025-07-07 | End: 2025-07-09 | Stop reason: HOSPADM

## 2025-07-07 RX ADMIN — TRAZODONE HYDROCHLORIDE 50 MG: 50 TABLET ORAL at 20:45

## 2025-07-07 RX ADMIN — HYDROXYZINE HYDROCHLORIDE 50 MG: 50 TABLET ORAL at 20:45

## 2025-07-07 RX ADMIN — SERTRALINE HYDROCHLORIDE 25 MG: 25 TABLET ORAL at 08:21

## 2025-07-07 ASSESSMENT — LIFESTYLE VARIABLES
HOW MANY STANDARD DRINKS CONTAINING ALCOHOL DO YOU HAVE ON A TYPICAL DAY: 3 OR 4
HOW OFTEN DO YOU HAVE A DRINK CONTAINING ALCOHOL: 2-4 TIMES A MONTH

## 2025-07-07 NOTE — GROUP NOTE
Group Therapy Note    Date: 7/7/2025    Group Start Time: 1100  Group End Time: 1135  Group Topic: Cognitive Skills    Romana Castaneda CTRS    Cognitive Skills Group Note        Date: July 7, 2025 Start Time: 11am  End Time: 1135 am      Number of Participants in Group & Unit Census:  9/17    Topic: cognitive skills    Goal of Group: pt will demonstrate improved coping skills and improved communication skills      Comments:     Patient did not participate in Cognitive Skills group, despite staff encouragement and explanation of benefits.  Patient remain seclusive to self.  Q15 minute safety checks maintained for patient safety and will continue to encourage patient to attend unit programming.              Signature:  EB MCKEON

## 2025-07-07 NOTE — PLAN OF CARE
Problem: Self Harm/Suicidality  Goal: Will have no self-injury during hospital stay  Description: INTERVENTIONS:  1.  Ensure constant observer at bedside with Q15M safety checks  2.  Maintain a safe environment  3.  Secure patient belongings  4.  Ensure family/visitors adhere to safety recommendations  5.  Ensure safety tray has been added to patient's diet order  6.  Every shift and PRN: Re-assess suicidal risk via Frequent Screener    7/7/2025 1101 by Maxime Huerta RN  Outcome: Progressing     Problem: Depression  Goal: Will be euthymic at discharge  Description: INTERVENTIONS:  1. Administer medication as ordered  2. Provide emotional support via 1:1 interaction with staff  3. Encourage involvement in milieu/groups/activities  4. Monitor for social isolation  7/7/2025 1101 by Maxime Huerta RN  Outcome: Progressing     Problem: Nutrition Deficit:  Goal: Optimize nutritional status  7/7/2025 1101 by Maxime Huerta RN  Outcome: Progressing  Patient currently denies thoughts of self harm or suicidal ideation. Patient denies any hallucinations or delusions. Patient stated he is experiencing some anxiety. Patient stated he did not sleep well, stated he did not take his trazadone that is prescribed because it makes him drowsy. Patient has not attended any groups through out shift, staff has encourage patient to attend groups to help with anxiety. Patient mood is cooperative and friendly but remains isolative to room. Safety checks continue every 15 minutes. Patient has remained safe. Will continue to support and monitor.

## 2025-07-07 NOTE — BH NOTE
[facility]  Day 3 Interdisciplinary Treatment Plan NOTE    Review Date & Time: 7/7/2025 1500    Admission Type:   Admission Type: Voluntary    Reason for admission:  Reason for Admission: Patient admitted from [facility] for suicidal ideations to overdose on street drugs. His father passed away while he was incarcerated - jailed for [employer] theft (served for 18 months and was released on May 29th). Is now homeless and has no support systems, signed paperwork. 7/5:High BP in AM - rechecked normal, patient stated that we plans to sleep all day, reports depression related to fathers passing and feelings of hopelessness, worried and stressed w/ housing situation, UA completed - + for cocaine, N.O. Zoloft, K+ replacement of 40 mEq given for K+ of 3.6  Estimated Length of Stay: 5-7 days  Estimated Discharge Date Update: to be determined by physician    PATIENT STRENGTHS:  Patient Strengths    Patient Strengths and Limitations:Limitations: Multiple barriers to leisure interests, Inappropriate/potentially harmful leisure interests, Difficult relationships / poor social skills, External locus of control, Tendency to isolate self  Addictive Behavior:Addictive Behavior  In the Past 3 Months, Have You Felt or Has Someone Told You That You Have a Problem With  : None  Medical Problems:  Past Medical History:   Diagnosis Date    Asthma        Risk:  Fall Risk   Douglas Scale Douglas Scale Score: 22  BVC    Change in scores no Changes to plan of Care no    Status EXAM:   Mental Status and Behavioral Exam  Normal: No  Level of Assistance: Independent/Self  Facial Expression: Flat, Sad, Worried  Affect: Appropriate  Level of Consciousness: Alert  Frequency of Checks: 4 times per hour, close  Mood:Normal: No  Mood: Depressed, Anxious, Sad  Motor Activity:Normal: No  Motor Activity: Unusual posture/gait  Eye Contact: Good  Observed Behavior: Cooperative, Friendly  Sexual Misconduct History: Current - no  Preception: [place] to

## 2025-07-07 NOTE — GROUP NOTE
Group Therapy Note    Date: 7/7/2025    Group Start Time: 1020  Group End Time: 1050  Group Topic: Psychotherapy    Celina Boyd MSW        Group Therapy Note    Attendees: 6/17     Patient was offered group therapy today but declined to participate despite encouragement from staff.  1:1 was offered    Discipline Responsible: /Counselor      Signature:  NITHIN Mckeon

## 2025-07-07 NOTE — PLAN OF CARE
Problem: Depression  Goal: Will be euthymic at discharge  Description: INTERVENTIONS:  1. Administer medication as ordered  2. Provide emotional support via 1:1 interaction with staff  3. Encourage involvement in milieu/groups/activities  4. Monitor for social isolation  7/6/2025 2320 by Imelda Ramírez, RN  Outcome: Progressing    Patient friendly and social with select peers and staff during shift assessment. Patient has been compliant with medication administration. Staff to provide 1:1 interaction for emotional support. Will continue to monitor.         Problem: Self Harm/Suicidality  Goal: Will have no self-injury during hospital stay  Description: INTERVENTIONS:  1.  Ensure constant observer at bedside with Q15M safety checks  2.  Maintain a safe environment  3.  Secure patient belongings  4.  Ensure family/visitors adhere to safety recommendations  5.  Ensure safety tray has been added to patient's diet order  6.  Every shift and PRN: Re-assess suicidal risk via Frequent Screener    7/6/2025 2320 by Imelda Ramírez RN  Outcome: Progressing  Flowsheets (Taken 7/6/2025 2312)  Will have no self-injury during hospital stay:   Ensure constant observer at bedside with Q15M safety checks   Maintain a safe environment   Secure patient belongings  Patient has not had any self harm or injury attempts since admission to hospital. Patient has been cooperative with staff and social with select peers. Patient will continue to have 15 minute safety rounding to ensure patient's safety. Will continue to monitor.

## 2025-07-08 PROCEDURE — 6370000000 HC RX 637 (ALT 250 FOR IP): Performed by: NURSE PRACTITIONER

## 2025-07-08 PROCEDURE — 1240000000 HC EMOTIONAL WELLNESS R&B

## 2025-07-08 PROCEDURE — 6370000000 HC RX 637 (ALT 250 FOR IP): Performed by: PSYCHIATRY & NEUROLOGY

## 2025-07-08 PROCEDURE — APPSS30 APP SPLIT SHARED TIME 16-30 MINUTES: Performed by: NURSE PRACTITIONER

## 2025-07-08 RX ORDER — TRAZODONE HYDROCHLORIDE 50 MG/1
50 TABLET ORAL NIGHTLY PRN
Qty: 30 TABLET | Refills: 0 | Status: SHIPPED | OUTPATIENT
Start: 2025-07-08

## 2025-07-08 RX ORDER — ALBUTEROL SULFATE 0.83 MG/ML
2.5 SOLUTION RESPIRATORY (INHALATION) EVERY 6 HOURS PRN
Qty: 120 EACH | Refills: 3 | Status: SHIPPED | OUTPATIENT
Start: 2025-07-08

## 2025-07-08 RX ORDER — HYDROXYZINE HYDROCHLORIDE 25 MG/1
50 TABLET, FILM COATED ORAL 3 TIMES DAILY PRN
Qty: 30 TABLET | Refills: 0 | Status: SHIPPED | OUTPATIENT
Start: 2025-07-08 | End: 2025-07-18

## 2025-07-08 RX ORDER — DIPHENHYDRAMINE HYDROCHLORIDE, ZINC ACETATE 2; .1 G/100G; G/100G
CREAM TOPICAL
Qty: 15 G | Refills: 0 | Status: SHIPPED | OUTPATIENT
Start: 2025-07-08

## 2025-07-08 RX ADMIN — DIPHENHYDRAMINE HYDROCHLORIDE, ZINC ACETATE: 2; .1 CREAM TOPICAL at 20:56

## 2025-07-08 RX ADMIN — DIPHENHYDRAMINE HYDROCHLORIDE, ZINC ACETATE: 2; .1 CREAM TOPICAL at 16:46

## 2025-07-08 RX ADMIN — TRAZODONE HYDROCHLORIDE 50 MG: 50 TABLET ORAL at 20:52

## 2025-07-08 RX ADMIN — HYDROXYZINE HYDROCHLORIDE 50 MG: 50 TABLET ORAL at 20:52

## 2025-07-08 RX ADMIN — SERTRALINE 50 MG: 50 TABLET, FILM COATED ORAL at 08:38

## 2025-07-08 RX ADMIN — NICOTINE POLACRILEX 2 MG: 2 LOZENGE ORAL at 19:35

## 2025-07-08 NOTE — PLAN OF CARE
Problem: Self Harm/Suicidality  Goal: Will have no self-injury during hospital stay  Description: INTERVENTIONS:  1.  Ensure constant observer at bedside with Q15M safety checks  2.  Maintain a safe environment  3.  Secure patient belongings  4.  Ensure family/visitors adhere to safety recommendations  5.  Ensure safety tray has been added to patient's diet order  6.  Every shift and PRN: Re-assess suicidal risk via Frequent Screener    7/7/2025 2323 by Alexi Navas LPN  Outcome: Progressing     Problem: Depression  Goal: Will be euthymic at discharge  Description: INTERVENTIONS:  1. Administer medication as ordered  2. Provide emotional support via 1:1 interaction with staff  3. Encourage involvement in milieu/groups/activities  4. Monitor for social isolation  7/7/2025 2323 by Alexi Navas LPN  Outcome: Progressing  Patient remains free from self harm and suicide at this time. Patient denies having thoughts of self harm and suicide. Patient states he still feels depressed due to his current situation, but his depression is getting better. Patient encouraged to continue to be compliant with  staff so patient can get the proper help that he needs. Patient encouraged to communicate with thoughts of suicide and self harm come about. Patient encouraged to make staff aware if depression worsens. Staff continue to monitor patient by doing 15 minute safety checks.

## 2025-07-08 NOTE — GROUP NOTE
Group Therapy Note    Date: 7/8/2025    Group Start Time: 1000  Group End Time: 1040  Group Topic: Psychotherapy    [facility] Celina Vizcarra MSW        Group Therapy Note    Attendees: 7/15     Patient was offered group therapy today but declined to participate despite encouragement from staff.  1:1 was offered    Discipline Responsible: /Counselor      Signature:  NITHIN Mckeon

## 2025-07-08 NOTE — PLAN OF CARE
Problem: Self Harm/Suicidality  Goal: Will have no self-injury during hospital stay  Description: INTERVENTIONS:  1.  Ensure constant observer at bedside with Q15M safety checks  2.  Maintain a safe environment  3.  Secure patient belongings  4.  Ensure family/visitors adhere to safety recommendations  5.  Ensure safety tray has been added to patient's diet order  6.  Every shift and PRN: Re-assess suicidal risk via Frequent Screener    7/8/2025 0915 by Vesna Marrufo LPN  Outcome: Progressing  Flowsheets (Taken 7/8/2025 0915)  Will have no self-injury during hospital stay:   Ensure constant observer at bedside with Q15M safety checks   Maintain a safe environment   Secure patient belongings     Problem: Depression  Goal: Will be euthymic at discharge  Description: INTERVENTIONS:  1. Administer medication as ordered  2. Provide emotional support via 1:1 interaction with staff  3. Encourage involvement in milieu/groups/activities  4. Monitor for social isolation  7/8/2025 0915 by Vesna Marrufo LPN  Outcome: Progressing     Problem: Nutrition Deficit:  Goal: Optimize nutritional status  Outcome: Progressing     Patient safe on the unit with 15 minute safety checks in place. Patient denies any thoughts of self harm or harm to others. Patient denies any visual/auditory hallucinations. Denies any depression and expressed slight anxiety. Will continue to monitor and assess.

## 2025-07-08 NOTE — GROUP NOTE
Group Therapy Note    Date: 7/8/2025    Group Start Time: 1100  Group End Time: 1130  Group Topic: Cognitive Skills    Romana Castaneda CTRS    Cognitive Skills Group Note        Date: July 8, 2025 Start Time: 11am  End Time: 11:30am      Number of Participants in Group & Unit Census:  7/15    Topic: cognitive skills    Goal of Group: pt will demonstrate improved coping skills and improved interpersonal relationships      Comments:     Patient did not participate in Cognitive Skills group, despite staff encouragement and explanation of benefits.  Patient remain seclusive to self.  Q15 minute safety checks maintained for patient safety and will continue to encourage patient to attend unit programming.              Signature:  EB MCKEON

## 2025-07-08 NOTE — DISCHARGE INSTR - DIET
Good nutrition is important when healing from an illness, injury, or surgery.  Follow any nutrition recommendations given to you during your hospital stay.   If you were given an oral nutrition supplement while in the hospital, continue to take this supplement at home.  You can take it with meals, in-between meals, and/or before bedtime. These supplements can be purchased at most local grocery stores, pharmacies, and chain [employer]-stores.   If you have any questions about your diet or nutrition, call the hospital and ask for the dietitian.  Regular diet as tolerated.

## 2025-07-08 NOTE — DISCHARGE INSTR - COC
Continuity of Care Form    Patient Name: Jagdish Gleason   :  1985  MRN:  [id number]    Admit date:  2025  Discharge date:  ***    Code Status Order: Full Code   Advance Directives:     Admitting Physician:  Liliana Ruiz MD  PCP: No primary care provider on file.    Discharging Nurse: ***  Discharging Hospital Unit/Room#: 0210/0210-01  Discharging Unit Phone Number: ***    Emergency Contact:   Extended Emergency Contact Information  Primary Emergency Contact: Leonie Gleason  Address: [address] [place] [id number] [facility]  Home Phone: [phone]  Mobile Phone: [phone]  Relation: Other    Past Surgical History:  History reviewed. No pertinent surgical history.    Immunization History:   Immunization History   Administered Date(s) Administered    TDaP, ADACEL (age 10y-64y), BOOSTRIX (age 10y+), IM, 0.5mL 12/15/2015       Active Problems:  Patient Active Problem List   Diagnosis Code    Acute sialoadenitis K11.21    Rib injury S29.9XXA    Trauma T14.90XA    Asthma J45.909    Polysubstance abuse (HCC) F19.10    Accidental fentanyl overdose (HCC) T40.411A    Cocaine abuse ([facility]) F14.10    Depression with suicidal ideation F32.A, R45.851    Suicidal ideation R45.851       Isolation/Infection:   Isolation            No Isolation          Patient Infection Status    None to display         Nurse Assessment:  Last Vital Signs: /82   Pulse 68   Temp 98.1 °F (36.7 °C) (Oral)   Resp 16   Ht 1.803 m (5' 10.98\")   Wt 81.6 kg (180 lb)   SpO2 100%   BMI 25.12 kg/m²     Last documented pain score (0-10 scale):    Last Weight:   Wt Readings from Last 1 Encounters:   25 81.6 kg (180 lb)     Mental Status:  {IP PT MENTAL STATUS:}    IV Access:  { ELLIE IV ACCESS:090677914}    Nursing Mobility/ADLs:  Walking   {CHP DME ADLs:039404577}  Transfer  {CHP DME ADLs:547388978}  Bathing  {CHP DME ADLs:618239259}  Dressing  {CHP DME ADLs:032756116}  Toileting  {CHP DME

## 2025-07-08 NOTE — GROUP NOTE
Group Therapy Note    Date: 7/8/2025    Group Start Time: 1330  Group End Time: 1415  Group Topic: Relaxation    Romana Castaneda CTRS    Relaxation Group Note        Date: July 8, 2025 Start Time: 1:30pm  End Time: 215pm      Number of Participants in Group & Unit Census:  7/14    Topic: relaxation group     Goal of Group: pt will identify benefits of using art for relaxation and coping skills      Comments:     Patient did not participate in Relaxation group, despite staff encouragement and explanation of benefits.  Patient remain seclusive to self.  Q15 minute safety checks maintained for patient safety and will continue to encourage patient to attend unit programming.            Signature:  EB MCKEON

## 2025-07-09 VITALS
HEIGHT: 71 IN | SYSTOLIC BLOOD PRESSURE: 118 MMHG | OXYGEN SATURATION: 97 % | TEMPERATURE: 97.8 F | WEIGHT: 180 LBS | BODY MASS INDEX: 25.2 KG/M2 | DIASTOLIC BLOOD PRESSURE: 81 MMHG | RESPIRATION RATE: 16 BRPM | HEART RATE: 75 BPM

## 2025-07-09 PROCEDURE — 6370000000 HC RX 637 (ALT 250 FOR IP): Performed by: PSYCHIATRY & NEUROLOGY

## 2025-07-09 RX ADMIN — SERTRALINE 50 MG: 50 TABLET, FILM COATED ORAL at 10:05

## 2025-07-09 RX ADMIN — HYDROXYZINE HYDROCHLORIDE 50 MG: 50 TABLET ORAL at 11:53

## 2025-07-09 RX ADMIN — NICOTINE POLACRILEX 2 MG: 2 LOZENGE ORAL at 10:25

## 2025-07-09 NOTE — PLAN OF CARE
Problem: Self Harm/Suicidality  Goal: Will have no self-injury during hospital stay  Description: INTERVENTIONS:  1.  Ensure constant observer at bedside with Q15M safety checks  2.  Maintain a safe environment  3.  Secure patient belongings  4.  Ensure family/visitors adhere to safety recommendations  5.  Ensure safety tray has been added to patient's diet order  6.  Every shift and PRN: Re-assess suicidal risk via Frequent Screener  Outcome: Progressing  Flowsheets (Taken 7/9/2025 0127)  Will have no self-injury during hospital stay:   Maintain a safe environment   Every shift and PRN: Re-assess suicidal risk via Frequent Screener  Note: Patient denies any suicidal or homicidal ideations, denies any hallucinations. Pt agreed to seek staff and report any intrusive thoughts of hurting self or others.  Pt remains free from any self-harm, safe environment maintained. Regular rounding every 15 minutes and random patient checks conducted for safety as per unit policy.        Problem: Depression  Goal: Will be euthymic at discharge  Description: INTERVENTIONS:  1. Administer medication as ordered  2. Provide emotional support via 1:1 interaction with staff  3. Encourage involvement in milieu/groups/activities  4. Monitor for social isolation  Outcome: Progressing  Note: Patient denies any depression or anxiety this shift.        Problem: Nutrition Deficit:  Goal: Optimize nutritional status  Outcome: Progressing  Flowsheets (Taken 7/9/2025 0134)  Nutrient intake appropriate for improving, restoring, or maintaining nutritional needs:   Assess nutritional status and recommend course of action   Monitor oral intake, labs, and treatment plans   Provide specific nutrition education to patient or family as appropriate  Note: Patient consumed 100% of snacks and drinks offered by staff this evening.

## 2025-07-09 NOTE — BH NOTE
Patient given tobacco quitline number 83684678455 at this time, refusing to call at this time, states \" I just dont want to quit now\"- patient given information as to the dangers of long term tobacco use. Continue to reinforce the importance of tobacco cessation.

## 2025-07-09 NOTE — BH NOTE
[facility]  Discharge Note    Pt discharged with followings belongings:   Dental Appliances: None  Vision - Corrective Lenses: None  Hearing Aid: None  Jewelry: None  Body Piercings Removed: N/A  Clothing: Shirt, Shorts, Hat  Other Valuables: Wallet   Valuables sent home with or returned to patient. Patient educated on aftercare instructions: Yes  Information faxed to [facility] by Staff  at 12:28 PM .Patient verbalize understanding of AVS: Yes.    Status EXAM upon discharge:  Mental Status and Behavioral Exam  Normal: No  Level of Assistance: Independent/Self  Facial Expression: Brightened  Affect: Appropriate  Level of Consciousness: Alert  Frequency of Checks: 4 times per hour, close  Mood:Normal: No  Mood: Anxious  Motor Activity:Normal: Yes  Motor Activity: Decreased  Eye Contact: Good  Observed Behavior: Friendly, Cooperative  Sexual Misconduct History: Current - no  Preception: [place] to person, [place] to time, [place] to place, [place] to situation  Attention:Normal: Yes  Attention: Distractible  Thought Processes: Unremarkable  Thought Content:Normal: Yes  Thought Content: Preoccupations  Depression Symptoms: No problems reported or observed.  Anxiety Symptoms: Generalized  Nova Symptoms: No problems reported or observed.  Hallucinations: None  Delusions: No  Memory:Normal: Yes  Memory: Poor recent, Poor remote  Insight and Judgment: No  Insight and Judgment: Poor judgment, Poor insight    Tobacco Screening:  Practical Counseling, on admission, leonidas X, if applicable and completed (first 3 are required if patient doesn't refuse):            ( ) Recognizing danger situations (included triggers and roadblocks)                    ( ) Coping skills (new ways to manage stress,relaxation techniques, changing routine, distraction)                                                           ( ) Basic information about quitting (benefits of quitting, techniques in how to quit, available resources  ( )

## 2025-07-09 NOTE — GROUP NOTE
Group Therapy Note    Date: 7/9/2025    Group Start Time: 1015  Group End Time: 1050  Group Topic: Psychotherapy    Celina Boyd MSW        Group Therapy Note    Attendees: 5/15     Patient was offered group therapy today but declined to participate despite encouragement from staff.  1:1 was offered    Discipline Responsible: /Counselor      Signature:  NITHIN Mckeon

## 2025-07-09 NOTE — TRANSITION OF CARE
[facility] Transition Record    Patient Name: Jagdish Gleason  YOB: 1985   Medical Record Number: [id number]  Date of Admission: 7/5/2025  4:59 AM   Date of Discharge: 7/9/25    Attending Provider: Liliana Ruiz MD   Discharging Provider: Dr. Ruiz  To contact this individual call [phone] and ask the  to page.  If unavailable, ask to be transferred to Behavioral Health Provider on call.  A Behavioral Health Provider will be available on call 24/7 and during holidays.    Primary Care Provider: No primary care provider on file.    Allergies   Allergen Reactions    Oxycodone-Acetaminophen Shortness Of Breath     Chest preasure    Endocet [Oxycodone-Acetaminophen] Other (See Comments)     Chest preasure       Reason for Admission: Reason for Admission to Psychiatric Unit:  Threat to self requiring 24 hour professional observation  A mental disorder causing major disability in social, interpersonal, occupational, and/or educational functioning that is leading to dangerous or life-threatening functioning, and that can only be addressed in an acute inpatient setting   A mental disorder that causes an inability to maintain adequate nurtrition or self-care, and family/community support cannot provide reliable, essential care, so that the patient cannont function at a less intensive level of care during evaluation and treatment   Concerns about patient's safety in the community  Past Mental Health Diagnosis: a history of  Anxiety Disorder and Alcohol and/or Drug Use Disorder  Triggering event or precipitating factor: Housing instability, Financial instability, Grief r/t loss , and Alcohol/Drug Relapse  Length of time/duration of triggering event: Weeks  Legal Status: Voluntary     CHIEF COMPLAINT:  Depression with suicidal ideation     Admission Diagnosis: Suicidal ideation [R45.851]  Depression with suicidal ideation [F32.A, R45.851]    * No surgery found *    Results for orders placed or

## 2025-07-09 NOTE — GROUP NOTE
Group Therapy Note    Date: 7/9/2025    Group Start Time: 1100  Group End Time: 1140  Group Topic: Cognitive Skills    Romana Castaneda CTRS    Cognitive Skills Group Note        Date: July 9, 2025 Start Time: 11am  End Time: 1140 am      Number of Participants in Group & Unit Census:  7/15    Topic: cognitive skills    Goal of Group: pt will demonstrate improved coping skills and improved ihterpersonal relationships      Comments:     Patient did not participate in Cognitive Skills group, despite staff encouragement and explanation of benefits.  Patient remain seclusive to self.  Q15 minute safety checks maintained for patient safety and will continue to encourage patient to attend unit programming.              Signature:  EB MCKEON

## 2025-07-09 NOTE — PROGRESS NOTES
Behavioral Services  Medicare Certification Upon Admission    I certify that this patient's inpatient psychiatric hospital admission is medically necessary for:    [x] (1) Treatment which could reasonably be expected to improve this patient's condition,       [x] (2) Or for diagnostic study;     AND     [x](2) The inpatient psychiatric services are provided while the individual is under the care of a physician and are included in the individualized plan of care.    Estimated length of stay/service 2-9 days    Plan for post-hospital care -outpatient care    Electronically signed by IRIS MELTON MD on 7/5/2025 at 4:44 PM      
  Daily Progress Note  7/6/2025    Patient Name: Jagdish Gleason    CHIEF COMPLAINT:  Depression with suicidal ideation           SUBJECTIVE:    Patient is seen today for a follow up assessment. He has been compliant with scheduled Zoloft 25 mg daily and behaviorally in control. He has not required emergency medications for agitation in the past 24 hours. He was resting in bed upon approach. Thought process linear and speech normal. He tells writer that he is \"alright.\" He reports that he has mostly been sleeping since admission. He reports slight improvement in depression but continues to feel hopeless and helpless. He reports improvement in suicidal ideation but is not yet able to contract for safety. He denies hallucinations and is not observed responding to internal stimuli. He denies homicidal ideation. He denies issues with sleep or appetite. He denies side effects from Zoloft. At this time, he remains at an increased risk to self and is unable to contract for safety. He requires continued inpatient hospitalization for safety and stabilization.     Appetite:  [x] Adequate/Unchanged  [] Increased  [] Decreased      Sleep:       [] Adequate/Unchanged  [x] Fair  [] Poor      Group Attendance on Unit:   [] Yes   [] Selectively    [x] No    Compliant with scheduled medications: [x] Yes  [] No    Received emergency medications in past 24 hrs: [] Yes   [x] No    Medication Side Effects: Denies         Mental Status Exam  Level of consciousness: Alert and awake   Appearance: Appropriate attire for setting, resting in bed, with fair  grooming and hygiene   Behavior/Motor: Approachable, engages with interviewer, no psychomotor abnormalities   Attitude toward examiner: Cooperative, attentive, good eye contact  Speech: spontaneous, normal rate, and normal volume   Mood:  \"Alright\"  Affect: Mood congruent   Thought processes: linear, goal directed, and coherent   Thought content: Denies homicidal ideation  Suicidal 
  Daily Progress Note  7/7/2025    Patient Name: Jagdish Gleason    CHIEF COMPLAINT:  Depression with suicidal ideation           SUBJECTIVE:    Patient is seen today for a follow up assessment. He has been compliant with scheduled Zoloft and behaviorally in control. He has not required emergency medications for agitation in the past 24 hours. He is resting in bed upon approach. He is alert and oriented x4. Thought process linear and speech normal. He tells writer that he is \"okay.\" He rates his depression and anxiety a 5/10, continuing to experience some hopelessness and helplessness. He denies homicidal ideation and hallucinations. He is not observed responding to internal stimuli. He denies issues with appetite but describes his sleep as \"so-so.\" He feels that the Zoloft is going well and denies side effects. At this time, he remains at an increased risk to self as he is unable to contract for safety in the community. He requires continued inpatient hospitalization for safety and stabilization until appropriate discharge is arranged.     Appetite:  [x] Adequate/Unchanged  [] Increased  [] Decreased      Sleep:       [] Adequate/Unchanged  [x] Fair  [] Poor      Group Attendance on Unit:   [] Yes   [] Selectively    [x] No    Compliant with scheduled medications: [x] Yes  [] No    Received emergency medications in past 24 hrs: [] Yes   [x] No    Medication Side Effects: Denies         Mental Status Exam  Level of consciousness: Alert and awake   Appearance: Appropriate attire for setting, resting in bed, with fair  grooming and hygiene   Behavior/Motor: Approachable, engages with interviewer, no psychomotor abnormalities   Attitude toward examiner: Cooperative, attentive, good eye contact  Speech: spontaneous, normal rate, and normal volume   Mood:  \"Okay\"  Affect: Mood congruent   Thought processes: linear, goal directed, and coherent   Thought content: Denies homicidal ideation  Suicidal Ideation: Reports 
CLINICAL PHARMACY NOTE: MEDS TO BEDS    Total # of Prescriptions Filled: 5   The following medications were delivered to the patient:  Albuterol Sulfate 2.5MG/3ML Solution  Hydroxyzine HCL 25MG Tablets   Sertraline HCL 50MG Tablets  Diphenhydramine-Zinc Ace 2-0.1 Cream   Trazodone HCL 50MG Tablets    Additional Documentation:  Delivered to [facility] FAVIOLA Coats at 11:25AM 7/9/25.   
Comprehensive Nutrition Assessment    Type and Reason for Visit:  Initial, Positive nutrition screen (Decreased appetite and intake, wt loss)    Nutrition Recommendations/Plan:   Continue current diet.   Request wt measurement.   Suggest pt follow up with PCP for routine labwork and health screenings. Monitor glucose.      Malnutrition Assessment:  Malnutrition Status:  Insufficient data (07/06/25 1529)    Context:  Acute Illness     Findings of the 6 clinical characteristics of malnutrition:  Energy Intake:  75% or less of estimated energy requirements for 7 or more days (per pt)  Weight Loss:  Unable to assess     Body Fat Loss:  Unable to assess     Muscle Mass Loss:  Unable to assess    Fluid Accumulation:  No fluid accumulation     Strength:  Not Performed    Nutrition Assessment:    Pt admitted due to depression with suicidal ideation. Pt states he was not eating well for the past month prior to admission due to drug use. States now his appetite is great and he is eating well. States wt went from 255lb down to 190-200lb (unable to confirm). Denied need for oral nutrition supplements. Requested juice be added to dinner, which will be provided.    Nutrition Related Findings:    No edema. Glucose: 134 (7/5). Other labs and meds reviewed. Hx: mild intermittent asthma, depression, polysubstance abuse, alcohol use.         Current Nutrition Intake & Therapies:    Average Meal Intake: 51-75% (estimated, appears to be improving)     ADULT DIET; Regular    Anthropometric Measures:  Height: 180.3 cm (5' 10.98\")  Ideal Body Weight (IBW): 172 lbs (78 kg)    Admission Body Weight: 81.6 kg (179 lb 14.3 oz)  Current Body Weight: 81.6 kg (179 lb 14.3 oz), 104.6 % IBW. Weight Source: Stated  Current BMI (kg/m2): 25.1  Usual Body Weight:  (255lb per pt)                          BMI Categories:  (Unable to assess)    Estimated Daily Nutrient Needs:  Energy Requirements Based On: Formula  Weight Used for Energy Requirements: 
LEN Malagon aware via phone discharge [id number] will be available for pick-up from [facility] [phone] [address] beginning at 9:00am and Rxs were not sent until 4:22pm but will be ready when we open 7/8/25 5:21pm harrison  
RT ASSESSMENT TREATMENT GOALS    [x]Pt Goal:  Pt will identify 1-2 positive coping skills by time of discharge.    []Pt Goal:  Pt will identify 1-2 positive aspects of self by time of discharge.    []Pt Goal:  Pt will remain on task/topic for 15-30 minutes during group by time of discharge.    [x]Pt Goal:  Pt will identify 1-2 aspects of relapse prevention plan by time of discharge.    []Pt Goal:  Pt will join in conversation with peers 1-2 times per group by time of discharge.    []Pt Goal:  Pt will identify 1-2 new leisure interests by time of discharge.    []Pt Goal:  Pt will not voice any delusional content by time of discharge.   
   * (Principal) Depression with suicidal ideation 7/5/2025 Yes    Suicidal ideation 7/5/2025 Yes       Plan:     Admitted to inpatient psych with patient suicidal ideation  Hypokalemia replace potassium  Cocaine abuse strongly recommended to quit  Alcohol abuse watch for withdrawal  Intermittently hypertensive likely secondary anxiety continue to monitor  Labs and medications reviewed.     DVT prophylaxis,  Pt mobile   Full code status       Consultations:   IP CONSULT TO INTERNAL MEDICINE      Billie Loera MD  7/6/2025  3:30 PM    Copy sent to Dr. Ward primary care provider on file.    Please note that this chart was generated using voice recognition Dragon dictation software.  Although every effort was made to ensure the accuracy of this automated transcription, some errors in transcription may have occurred.   
lozenge 2 mg, 2 mg, Oral, Q1H PRN  polyethylene glycol (GLYCOLAX) packet 17 g, 17 g, Oral, Daily PRN  traZODone (DESYREL) tablet 50 mg, 50 mg, Oral, Nightly PRN    ASSESSMENT  Depression with suicidal ideation         PATIENT HANDOFF  Patient symptoms are: Modestly improving   Monitor need and frequency of PRN medications.  Encourage participation in groups and milieu.  Medication changes and discharge planning per attending  Follow-up daily while inpatient.     Patient continues to be monitored in the inpatient psychiatric facility at [facility] for safety and stabilization. Patient continues to need, on a daily basis, active treatment furnished directly by or requiring the supervision of inpatient psychiatric personnel.    Electronically signed by ORACIO Gomez CNP on 7/8/2025 at 3:32 PM    **This report has been created using voice recognition software. It may contain minor errors which are inherent in voice recognition technology.**  I independently saw and evaluated the patient.  I reviewed the nurse practitioners documentation above.  Principle diagnosis we are treating for is Depression with suicidal ideation. Any additional comments or changes to the nurse practitioners documentation are stated below otherwise agree with assessment.  Plan will be as follows:  Patient reports improvement in his mood.  Reports tolerating his Zoloft 50 mg.  Planning to enroll in AOD services at [facility].  Continue current medications with no change      PLAN  Patient s symptoms   improving  Attempt to develop insight  Psycho-education conducted.  Supportive Therapy conducted.  Probable discharge is tomorrow to [facility] AOD serivces  Follow-up daily while on inpatient unit  Time Spent on patient care is more than 35 minutes in the examination, evaluation, counseling and review of medications.      Electronically signed by Liliana Ruiz MD on 7/8/25 at 7:55 PM EDT

## 2025-07-09 NOTE — DISCHARGE INSTRUCTIONS
Information:  Medications:   Medication summary provided   I understand that I should take only the medications on my list.     -why and when I need to take each medicine.     -which side effects to watch for.     -that I should carry my medication information at all times in case of     Emergency situations.    I will take all of my medicines to follow up appointments.     -check with my physician or pharmacist before taking any new    Medication, over the counter product or drink alcohol.    -Ask about food, drug or dietary supplement interactions.    -discard old lists and update records with medication providers.    Notify Physician:  Notify physician if you notice:   Always call 911 if you feel your life is in danger  In case of an emergency call 911 immediately!  If 911 is not available call your local emergency medical system for help    Behavioral Health Follow Up:  Original Referral Source: PPU  Discharge Diagnosis: Suicidal ideation [R45.851]  Depression with suicidal ideation [F32.A, R45.851]  Recommendations for Level of Care:  Patient status at discharge: Stable  My hospital  was: [facility]  Aftercare plan faxed: Yes   -faxed by: Staff   -date: 7/9/25   -time: 1200  Prescriptions: Meds to bed    Smoking: Quit Smoking.   Call the [employer]'s smoking quitline at 8-771-32A-QUIT  Know the signs of a heart attack   If you have any of the following symptoms call 911 immediately, do not wait more    Than five minutes.    1. Pressure, fullness and/ or squeezing in the center of the chest spreading to    The jaw, neck or shoulder.    2. Chest discomfort with light headedness, fainting, sweating, nausea or    Shortness of breath.   3. Upper abdominal pressure or discomfort.   4. Lower chest pain, back pain, unusual fatigue, shortness of breath, nausea   Or dizziness.     General Information:   Questions regarding your bill: Call HELP program [phone]     Suicide Hotline ([facility] Crisis Care Line)

## 2025-07-09 NOTE — CARE COORDINATION
Discharge Arrangements:  Jagdish requests transport to the [facility] to complete a walk-in assessment with goal of admitting to the [facility] substance recovery programming.     Guardian notified: n/a    Discharge destination/address: [address] [facility] [address]    Transported by:      Jagdish was accepting of his mental health followup appointment which was scheduled with the [facility] for 07/14/2025 at 2pm. He was discharged to the [facility] location to complete a walk-in assessment for admission to the substance abuse recovery program per his request.     *YEHUDA resources were offered to patient throughout admission and at time of discharge. This list of [facility] YEHUDA providers was provided to patient:     [facility]  [address] [facility] [address]   [address] [facility] [address]  Phone: [phone]     Phone: [phone]    [facility] [place] [facility]   [address] [facility] [address]   [address] [facility] [address]  Phone: [phone]     Phone: [phone]    [facility] [place]  [address] [facility] [address]    [address] [facility] [facility] [address]  Phone: [phone]     Phone: [phone]    [facility]     [facility]   [address] [place]   [address] [facility] [address]  [place] [address]      Phone: [phone]  Phone: [phone]        [facility]  [address] [facility] [address]   [address] [place] [address]  Phone: [phone]     Phone:  [phone]    [facility]      [facility]  [address] [facility] [address]   [address] [facility] [address]  Phone: [phone]     Phone: [phone]    [facility]  [address] [facility] [address]   [address] [facility] [address]  Phone: [phone]     Phone: [phone]    [facility]  [facility] 
BHI Biopsychosocial Assessment    Current Level of Psychosocial Functioning     Independent xxx  Dependent    Minimal Assist     Comments:    Psychosocial High Risk Factors (check all that apply)    Unable to obtain meds   Chronic illness/pain    Substance abuse xx  Lack of Family Support xx  Financial stress xx  Isolation   Inadequate Community Resources  Suicide attempt(s)  Not taking medications xx  Victim of crime   Developmental Delay  Unable to manage personal needs    Age 65 or older   Homeless xx  No transportation   Readmission within 30 days  Unemployment  Traumatic Event    Comments:   Psychiatric Advanced Directives: none indicated     Family to Involve in Treatment:     Sexual Orientation:  n/a    Patient Strengths: exploring AOD recovery treatment     Patient Barriers: recently released from [facility], no income, homeless       Opiate Education Provided:  denies       CMHC/mental health history: history of link to [facility]     Plan of Care   medication management, group/individual therapies, family meetings, psycho -education, treatment team meetings to assist with stabilization    Initial Discharge Plan:  exploring AOD recovery treatment       Clinical Summary:  Jagdish is a 40 year old single male who has been admitted to [facility] with report of suicidal ideation with plan to use heroin to overdose per chart report. Jagdish is currently homeless since his release from incarceration. He states he has been living on the \"streets\" that he has been using alcohol and cocaine daily. He states he has three children, they live with their mother. He has no current income. He states he is exploring AOD treatment at discharge.   Ongoing support and encouragement offered.         
[phone]   albuterol (2.5 MG/3ML) 0.083% nebulizer solution  diphenhydrAMINE-zinc acetate 2-0.1 % cream  hydrOXYzine HCl 25 MG tablet  sertraline 50 MG tablet  traZODone 50 MG tablet         Follow Up Appointment: [facility]  [address]  [facility] [address]  [phone]  Go today  You can WALK-IN anytime MON-FRI from 9AM-1PM to inquire about the [facility] 3.5 mental health and substance use treatment program    [facility]  [address] [facility] [address]  [phone]  Follow up on 7/14/2025  @2pm. Please utilize this outpatient dual (mental health/substance abuse) services outpatient appointment if you do not admit today to the recovery programming.

## 2025-07-10 NOTE — DISCHARGE SUMMARY
Provider Discharge Summary     Patient ID:  Jagdish Gleason  [id number]  40 y.o.  1985    Admit date: 2025    Discharge date and time: 7/10/2025  1:32 PM     Admitting Physician: Billie Loera MD     Discharge Physician: Liliana Ruiz MD    Admission Diagnoses: Suicidal ideation [R45.851]  Depression with suicidal ideation [F32.A, R45.851]    Discharge Diagnoses:      Depression with suicidal ideation     Patient Active Problem List   Diagnosis Code    Acute sialoadenitis K11.21    Rib injury S29.9XXA    Trauma T14.90XA    Asthma J45.909    Polysubstance abuse (HCC) F19.10    Accidental fentanyl overdose (HCC) T40.411A    Cocaine abuse (HCC) F14.10    Depression with suicidal ideation F32.A, R45.851    Suicidal ideation R45.851        Admission Condition: poor    Discharged Condition: stable    Indication for Admission: threat to self    History of Present Illnes (present tense wording is of findings from admission exam and are not necessarily indicative of current findings):   Jagdish Gleason is a 40 y.o. male who has a past medical history of anxiety and asthma. Patient presented to the ED for suicidal ideation with plan to overdose on heroin. Patient reported increased depression and hopelessness related to recent release from alf, poor social support, and recent death of father.      Patient was seen for initial evaluation today. He was resting in bed upon approach. He was alert and oriented x4. He was agreeable to conversation. Thought process linear and speech normal. He explains to writer that he was released from alf on May 29th after serving 18 months. He had \"nowhere to go\" upon release. He was planning to stay with his father but his father  prior to his release. He has been homeless since release, stating that he has been \"beating myself into the ground.\" He has been abusing cocaine and alcohol. He has been \"burning up\" and \"wanted to end my life.\" He reports suicidal ideation with plan to

## 2025-08-28 ENCOUNTER — HOSPITAL ENCOUNTER (EMERGENCY)
Age: 40
Discharge: HOME | End: 2025-08-28
Attending: COUNSELOR
Payer: COMMERCIAL

## 2025-08-28 VITALS
OXYGEN SATURATION: 99 % | HEIGHT: 71 IN | DIASTOLIC BLOOD PRESSURE: 73 MMHG | BODY MASS INDEX: 30.8 KG/M2 | RESPIRATION RATE: 16 BRPM | HEART RATE: 81 BPM | TEMPERATURE: 98.6 F | WEIGHT: 220 LBS | SYSTOLIC BLOOD PRESSURE: 115 MMHG

## 2025-08-28 DIAGNOSIS — E87.6 HYPOKALEMIA: Primary | ICD-10-CM

## 2025-08-28 DIAGNOSIS — R19.7 DIARRHEA, UNSPECIFIED TYPE: ICD-10-CM

## 2025-08-28 DIAGNOSIS — R53.83 OTHER FATIGUE: ICD-10-CM

## 2025-08-28 LAB
ALBUMIN SERPL-MCNC: 4.3 G/DL (ref 3.5–5.2)
ALP SERPL-CCNC: 98 U/L (ref 40–129)
ALT SERPL-CCNC: 19 U/L (ref 10–50)
AMPHET UR QL SCN: NEGATIVE
ANION GAP SERPL CALCULATED.3IONS-SCNC: 14 MMOL/L (ref 9–16)
AST SERPL-CCNC: 23 U/L (ref 10–50)
BACTERIA URNS QL MICRO: ABNORMAL
BARBITURATES UR QL SCN: NEGATIVE
BASOPHILS # BLD: 0 K/UL (ref 0–0.2)
BASOPHILS NFR BLD: 0 % (ref 0–2)
BENZODIAZ UR QL: NEGATIVE
BILIRUB DIRECT SERPL-MCNC: 0.3 MG/DL (ref 0–0.3)
BILIRUB INDIRECT SERPL-MCNC: 0.5 MG/DL (ref 0–1)
BILIRUB SERPL-MCNC: 0.8 MG/DL (ref 0–1.2)
BILIRUB UR QL STRIP: ABNORMAL
BUN SERPL-MCNC: 17 MG/DL (ref 6–20)
CALCIUM SERPL-MCNC: 9.8 MG/DL (ref 8.6–10.4)
CANNABINOIDS UR QL SCN: NEGATIVE
CASTS #/AREA URNS LPF: ABNORMAL /LPF
CHLORIDE SERPL-SCNC: 94 MMOL/L (ref 98–107)
CLARITY UR: CLEAR
CO2 SERPL-SCNC: 26 MMOL/L (ref 20–31)
COCAINE UR QL SCN: NEGATIVE
COLOR UR: ABNORMAL
CREAT SERPL-MCNC: 1.2 MG/DL (ref 0.7–1.2)
EOSINOPHIL # BLD: 0 K/UL (ref 0–0.4)
EOSINOPHILS RELATIVE PERCENT: 0 % (ref 0–4)
EPI CELLS #/AREA URNS HPF: ABNORMAL /HPF
ERYTHROCYTE [DISTWIDTH] IN BLOOD BY AUTOMATED COUNT: 11.7 % (ref 11.5–14.9)
FENTANYL UR QL: NEGATIVE
FLUAV RNA RESP QL NAA+PROBE: NOT DETECTED
FLUBV RNA RESP QL NAA+PROBE: NOT DETECTED
GFR, ESTIMATED: 78 ML/MIN/1.73M2
GLUCOSE SERPL-MCNC: 125 MG/DL (ref 74–99)
GLUCOSE UR STRIP-MCNC: NEGATIVE MG/DL
HCT VFR BLD AUTO: 44.1 % (ref 41–53)
HGB BLD-MCNC: 15.5 G/DL (ref 13.5–17.5)
HGB UR QL STRIP.AUTO: ABNORMAL
IMM GRANULOCYTES # BLD AUTO: 0 K/UL (ref 0–0.3)
IMM GRANULOCYTES NFR BLD: 0 %
KETONES UR STRIP-MCNC: NEGATIVE MG/DL
LEUKOCYTE ESTERASE UR QL STRIP: NEGATIVE
LYMPHOCYTES NFR BLD: 1.14 K/UL (ref 1–4.8)
LYMPHOCYTES RELATIVE PERCENT: 17 % (ref 24–44)
MAGNESIUM SERPL-MCNC: 2.1 MG/DL (ref 1.6–2.6)
MCH RBC QN AUTO: 30.8 PG (ref 26–34)
MCHC RBC AUTO-ENTMCNC: 35.1 G/DL (ref 31–37)
MCV RBC AUTO: 87.5 FL (ref 80–100)
METHADONE UR QL: NEGATIVE
MONOCYTES NFR BLD: 0.67 K/UL (ref 0.1–1.3)
MONOCYTES NFR BLD: 10 % (ref 1–7)
MORPHOLOGY: ABNORMAL
NEUTROPHILS NFR BLD: 73 % (ref 36–66)
NEUTS SEG NFR BLD: 4.89 K/UL (ref 1.3–9.1)
NITRITE UR QL STRIP: NEGATIVE
NRBC BLD-RTO: 0 PER 100 WBC
OPIATES UR QL SCN: NEGATIVE
OXYCODONE UR QL SCN: NEGATIVE
PCP UR QL SCN: NEGATIVE
PH UR STRIP: 6 [PH] (ref 5–8)
PLATELET # BLD AUTO: 272 K/UL (ref 150–450)
PMV BLD AUTO: 8.8 FL (ref 8–13.5)
POTASSIUM SERPL-SCNC: 3.5 MMOL/L (ref 3.7–5.3)
PROT SERPL-MCNC: 7.9 G/DL (ref 6.6–8.7)
PROT UR STRIP-MCNC: ABNORMAL MG/DL
RBC # BLD AUTO: 5.04 M/UL (ref 4.21–5.77)
RBC #/AREA URNS HPF: ABNORMAL /HPF
SARS-COV-2 RNA RESP QL NAA+PROBE: NOT DETECTED
SODIUM SERPL-SCNC: 134 MMOL/L (ref 136–145)
SOURCE: NORMAL
SP GR UR STRIP: 1.03 (ref 1–1.03)
SPECIMEN DESCRIPTION: NORMAL
TEST INFORMATION: NORMAL
TROPONIN I SERPL HS-MCNC: 6 NG/L (ref 0–22)
UROBILINOGEN UR STRIP-ACNC: NORMAL EU/DL (ref 0–1)
WBC #/AREA URNS HPF: ABNORMAL /HPF
WBC OTHER # BLD: 6.7 K/UL (ref 3.5–11)

## 2025-08-28 PROCEDURE — 84484 ASSAY OF TROPONIN QUANT: CPT

## 2025-08-28 PROCEDURE — 85025 COMPLETE CBC W/AUTO DIFF WBC: CPT

## 2025-08-28 PROCEDURE — 80048 BASIC METABOLIC PNL TOTAL CA: CPT

## 2025-08-28 PROCEDURE — 81001 URINALYSIS AUTO W/SCOPE: CPT

## 2025-08-28 PROCEDURE — 36415 COLL VENOUS BLD VENIPUNCTURE: CPT

## 2025-08-28 PROCEDURE — 80307 DRUG TEST PRSMV CHEM ANLYZR: CPT

## 2025-08-28 PROCEDURE — 80076 HEPATIC FUNCTION PANEL: CPT

## 2025-08-28 PROCEDURE — 6370000000 HC RX 637 (ALT 250 FOR IP): Performed by: COUNSELOR

## 2025-08-28 PROCEDURE — 83735 ASSAY OF MAGNESIUM: CPT

## 2025-08-28 PROCEDURE — 99285 EMERGENCY DEPT VISIT HI MDM: CPT

## 2025-08-28 PROCEDURE — 87636 SARSCOV2 & INF A&B AMP PRB: CPT

## 2025-08-28 PROCEDURE — 93005 ELECTROCARDIOGRAM TRACING: CPT | Performed by: COUNSELOR

## 2025-08-28 RX ORDER — POTASSIUM CHLORIDE 1500 MG/1
40 TABLET, EXTENDED RELEASE ORAL ONCE
Status: COMPLETED | OUTPATIENT
Start: 2025-08-28 | End: 2025-08-28

## 2025-08-28 RX ADMIN — POTASSIUM CHLORIDE 40 MEQ: 1500 TABLET, EXTENDED RELEASE ORAL at 12:19

## 2025-08-28 ASSESSMENT — PAIN - FUNCTIONAL ASSESSMENT
PAIN_FUNCTIONAL_ASSESSMENT: 0-10
PAIN_FUNCTIONAL_ASSESSMENT: 0-10

## 2025-08-28 ASSESSMENT — ENCOUNTER SYMPTOMS
DIARRHEA: 1
BLOOD IN STOOL: 0
COUGH: 0
SHORTNESS OF BREATH: 0
NAUSEA: 0
ABDOMINAL PAIN: 0
SORE THROAT: 0
VOMITING: 0

## 2025-08-28 ASSESSMENT — PAIN SCALES - GENERAL: PAINLEVEL_OUTOF10: 2

## 2025-08-29 LAB
EKG ATRIAL RATE: 77 BPM
EKG P AXIS: 77 DEGREES
EKG P-R INTERVAL: 162 MS
EKG Q-T INTERVAL: 404 MS
EKG QRS DURATION: 102 MS
EKG QTC CALCULATION (BAZETT): 457 MS
EKG R AXIS: 81 DEGREES
EKG T AXIS: 1 DEGREES
EKG VENTRICULAR RATE: 77 BPM